# Patient Record
Sex: MALE | Race: BLACK OR AFRICAN AMERICAN | NOT HISPANIC OR LATINO | Employment: FULL TIME | URBAN - METROPOLITAN AREA
[De-identification: names, ages, dates, MRNs, and addresses within clinical notes are randomized per-mention and may not be internally consistent; named-entity substitution may affect disease eponyms.]

---

## 2024-05-31 ENCOUNTER — APPOINTMENT (OUTPATIENT)
Dept: RADIOLOGY | Facility: HOSPITAL | Age: 34
End: 2024-05-31

## 2024-05-31 ENCOUNTER — HOSPITAL ENCOUNTER (INPATIENT)
Facility: HOSPITAL | Age: 34
LOS: 4 days | Discharge: HOME | End: 2024-06-06
Attending: EMERGENCY MEDICINE | Admitting: ORTHOPAEDIC SURGERY

## 2024-05-31 DIAGNOSIS — S42.402A CLOSED FRACTURE OF LEFT ELBOW, INITIAL ENCOUNTER: Primary | ICD-10-CM

## 2024-05-31 DIAGNOSIS — S52.272A CLOSED MONTEGGIA'S FRACTURE OF LEFT ULNA, INITIAL ENCOUNTER: ICD-10-CM

## 2024-05-31 LAB
ANION GAP SERPL CALC-SCNC: 13 MMOL/L (ref 10–20)
APTT PPP: 23 SECONDS (ref 27–38)
BUN SERPL-MCNC: 9 MG/DL (ref 6–23)
CALCIUM SERPL-MCNC: 9.5 MG/DL (ref 8.6–10.6)
CHLORIDE SERPL-SCNC: 104 MMOL/L (ref 98–107)
CO2 SERPL-SCNC: 27 MMOL/L (ref 21–32)
CREAT SERPL-MCNC: 1.17 MG/DL (ref 0.5–1.3)
EGFRCR SERPLBLD CKD-EPI 2021: 84 ML/MIN/1.73M*2
ERYTHROCYTE [DISTWIDTH] IN BLOOD BY AUTOMATED COUNT: 12.3 % (ref 11.5–14.5)
GLUCOSE SERPL-MCNC: 106 MG/DL (ref 74–99)
HCT VFR BLD AUTO: 43.6 % (ref 41–52)
HGB BLD-MCNC: 15 G/DL (ref 13.5–17.5)
INR PPP: 1 (ref 0.9–1.1)
MCH RBC QN AUTO: 26.9 PG (ref 26–34)
MCHC RBC AUTO-ENTMCNC: 34.4 G/DL (ref 32–36)
MCV RBC AUTO: 78 FL (ref 80–100)
NRBC BLD-RTO: 0 /100 WBCS (ref 0–0)
PLATELET # BLD AUTO: 194 X10*3/UL (ref 150–450)
POTASSIUM SERPL-SCNC: 4.2 MMOL/L (ref 3.5–5.3)
PROTHROMBIN TIME: 11.2 SECONDS (ref 9.8–12.8)
RBC # BLD AUTO: 5.58 X10*6/UL (ref 4.5–5.9)
SODIUM SERPL-SCNC: 140 MMOL/L (ref 136–145)
WBC # BLD AUTO: 7.1 X10*3/UL (ref 4.4–11.3)

## 2024-05-31 PROCEDURE — 85730 THROMBOPLASTIN TIME PARTIAL: CPT | Performed by: EMERGENCY MEDICINE

## 2024-05-31 PROCEDURE — 73060 X-RAY EXAM OF HUMERUS: CPT | Mod: LT

## 2024-05-31 PROCEDURE — 73100 X-RAY EXAM OF WRIST: CPT | Mod: LT

## 2024-05-31 PROCEDURE — 73110 X-RAY EXAM OF WRIST: CPT | Mod: LT

## 2024-05-31 PROCEDURE — 2500000005 HC RX 250 GENERAL PHARMACY W/O HCPCS

## 2024-05-31 PROCEDURE — 73090 X-RAY EXAM OF FOREARM: CPT | Mod: LT

## 2024-05-31 PROCEDURE — 71045 X-RAY EXAM CHEST 1 VIEW: CPT

## 2024-05-31 PROCEDURE — 73090 X-RAY EXAM OF FOREARM: CPT | Mod: LEFT SIDE | Performed by: STUDENT IN AN ORGANIZED HEALTH CARE EDUCATION/TRAINING PROGRAM

## 2024-05-31 PROCEDURE — 99285 EMERGENCY DEPT VISIT HI MDM: CPT | Performed by: EMERGENCY MEDICINE

## 2024-05-31 PROCEDURE — 96375 TX/PRO/DX INJ NEW DRUG ADDON: CPT

## 2024-05-31 PROCEDURE — 73060 X-RAY EXAM OF HUMERUS: CPT | Mod: LEFT SIDE | Performed by: STUDENT IN AN ORGANIZED HEALTH CARE EDUCATION/TRAINING PROGRAM

## 2024-05-31 PROCEDURE — 2500000004 HC RX 250 GENERAL PHARMACY W/ HCPCS (ALT 636 FOR OP/ED)

## 2024-05-31 PROCEDURE — 36415 COLL VENOUS BLD VENIPUNCTURE: CPT

## 2024-05-31 PROCEDURE — 36415 COLL VENOUS BLD VENIPUNCTURE: CPT | Performed by: EMERGENCY MEDICINE

## 2024-05-31 PROCEDURE — 76000 FLUOROSCOPY <1 HR PHYS/QHP: CPT

## 2024-05-31 PROCEDURE — 2500000004 HC RX 250 GENERAL PHARMACY W/ HCPCS (ALT 636 FOR OP/ED): Performed by: EMERGENCY MEDICINE

## 2024-05-31 PROCEDURE — 71045 X-RAY EXAM CHEST 1 VIEW: CPT | Performed by: STUDENT IN AN ORGANIZED HEALTH CARE EDUCATION/TRAINING PROGRAM

## 2024-05-31 PROCEDURE — 73030 X-RAY EXAM OF SHOULDER: CPT | Mod: LEFT SIDE | Performed by: STUDENT IN AN ORGANIZED HEALTH CARE EDUCATION/TRAINING PROGRAM

## 2024-05-31 PROCEDURE — 73070 X-RAY EXAM OF ELBOW: CPT | Mod: LT

## 2024-05-31 PROCEDURE — 73070 X-RAY EXAM OF ELBOW: CPT | Mod: LEFT SIDE | Performed by: STUDENT IN AN ORGANIZED HEALTH CARE EDUCATION/TRAINING PROGRAM

## 2024-05-31 PROCEDURE — 73030 X-RAY EXAM OF SHOULDER: CPT | Mod: LT

## 2024-05-31 PROCEDURE — 73100 X-RAY EXAM OF WRIST: CPT | Mod: LEFT SIDE | Performed by: STUDENT IN AN ORGANIZED HEALTH CARE EDUCATION/TRAINING PROGRAM

## 2024-05-31 PROCEDURE — 94681 O2 UPTK CO2 OUTP % O2 XTRC: CPT

## 2024-05-31 PROCEDURE — 85027 COMPLETE CBC AUTOMATED: CPT | Performed by: EMERGENCY MEDICINE

## 2024-05-31 PROCEDURE — 96376 TX/PRO/DX INJ SAME DRUG ADON: CPT

## 2024-05-31 PROCEDURE — 73080 X-RAY EXAM OF ELBOW: CPT | Mod: LT

## 2024-05-31 PROCEDURE — 86901 BLOOD TYPING SEROLOGIC RH(D): CPT

## 2024-05-31 PROCEDURE — 85610 PROTHROMBIN TIME: CPT | Performed by: EMERGENCY MEDICINE

## 2024-05-31 PROCEDURE — 86900 BLOOD TYPING SEROLOGIC ABO: CPT

## 2024-05-31 PROCEDURE — 80048 BASIC METABOLIC PNL TOTAL CA: CPT | Performed by: EMERGENCY MEDICINE

## 2024-05-31 PROCEDURE — 96374 THER/PROPH/DIAG INJ IV PUSH: CPT

## 2024-05-31 PROCEDURE — 99285 EMERGENCY DEPT VISIT HI MDM: CPT | Mod: 25

## 2024-05-31 PROCEDURE — 24620 CLTX MONTEGGIA FX DISLC ELBW: CPT | Mod: LT

## 2024-05-31 PROCEDURE — 0PSJXZZ REPOSITION LEFT RADIUS, EXTERNAL APPROACH: ICD-10-PCS

## 2024-05-31 RX ORDER — HYDROMORPHONE HYDROCHLORIDE 1 MG/ML
INJECTION, SOLUTION INTRAMUSCULAR; INTRAVENOUS; SUBCUTANEOUS
Status: COMPLETED
Start: 2024-05-31 | End: 2024-05-31

## 2024-05-31 RX ORDER — ONDANSETRON HYDROCHLORIDE 2 MG/ML
4 INJECTION, SOLUTION INTRAVENOUS ONCE
Status: COMPLETED | OUTPATIENT
Start: 2024-05-31 | End: 2024-05-31

## 2024-05-31 RX ORDER — HYDROMORPHONE HYDROCHLORIDE 1 MG/ML
1 INJECTION, SOLUTION INTRAMUSCULAR; INTRAVENOUS; SUBCUTANEOUS ONCE
Status: COMPLETED | OUTPATIENT
Start: 2024-05-31 | End: 2024-05-31

## 2024-05-31 RX ORDER — PROPOFOL 10 MG/ML
150 INJECTION, EMULSION INTRAVENOUS ONCE
Status: COMPLETED | OUTPATIENT
Start: 2024-05-31 | End: 2024-05-31

## 2024-05-31 RX ORDER — PROPOFOL 10 MG/ML
INJECTION, EMULSION INTRAVENOUS
Status: COMPLETED
Start: 2024-05-31 | End: 2024-05-31

## 2024-05-31 RX ORDER — ONDANSETRON HYDROCHLORIDE 2 MG/ML
INJECTION, SOLUTION INTRAVENOUS
Status: COMPLETED
Start: 2024-05-31 | End: 2024-05-31

## 2024-05-31 RX ORDER — HYDROMORPHONE HYDROCHLORIDE 1 MG/ML
0.5 INJECTION, SOLUTION INTRAMUSCULAR; INTRAVENOUS; SUBCUTANEOUS ONCE
Status: COMPLETED | OUTPATIENT
Start: 2024-05-31 | End: 2024-05-31

## 2024-05-31 RX ADMIN — Medication 4 L/MIN: at 23:10

## 2024-05-31 RX ADMIN — PROPOFOL 150 MG: 10 INJECTION, EMULSION INTRAVENOUS at 23:08

## 2024-05-31 RX ADMIN — HYDROMORPHONE HYDROCHLORIDE 1 MG: 1 INJECTION, SOLUTION INTRAMUSCULAR; INTRAVENOUS; SUBCUTANEOUS at 22:09

## 2024-05-31 RX ADMIN — ONDANSETRON 4 MG: 2 INJECTION, SOLUTION INTRAMUSCULAR; INTRAVENOUS at 19:40

## 2024-05-31 RX ADMIN — HYDROMORPHONE HYDROCHLORIDE 0.5 MG: 1 INJECTION, SOLUTION INTRAMUSCULAR; INTRAVENOUS; SUBCUTANEOUS at 19:40

## 2024-05-31 RX ADMIN — HYDROMORPHONE HYDROCHLORIDE 1 MG: 1 INJECTION, SOLUTION INTRAMUSCULAR; INTRAVENOUS; SUBCUTANEOUS at 23:25

## 2024-05-31 RX ADMIN — ONDANSETRON HYDROCHLORIDE 4 MG: 2 INJECTION, SOLUTION INTRAVENOUS at 19:40

## 2024-05-31 RX ADMIN — Medication 50 MG: at 23:05

## 2024-05-31 RX ADMIN — HYDROMORPHONE HYDROCHLORIDE 1 MG: 1 INJECTION, SOLUTION INTRAMUSCULAR; INTRAVENOUS; SUBCUTANEOUS at 20:23

## 2024-05-31 RX ADMIN — ONDANSETRON 4 MG: 2 INJECTION INTRAMUSCULAR; INTRAVENOUS at 23:14

## 2024-05-31 ASSESSMENT — PAIN DESCRIPTION - ORIENTATION
ORIENTATION: LEFT
ORIENTATION: LEFT

## 2024-05-31 ASSESSMENT — PAIN DESCRIPTION - LOCATION
LOCATION: ARM
LOCATION: ARM

## 2024-05-31 ASSESSMENT — PAIN DESCRIPTION - DESCRIPTORS: DESCRIPTORS: SHOOTING;SHARP

## 2024-05-31 ASSESSMENT — LIFESTYLE VARIABLES
HAVE YOU EVER FELT YOU SHOULD CUT DOWN ON YOUR DRINKING: NO
HAVE PEOPLE ANNOYED YOU BY CRITICIZING YOUR DRINKING: NO
EVER HAD A DRINK FIRST THING IN THE MORNING TO STEADY YOUR NERVES TO GET RID OF A HANGOVER: NO
EVER FELT BAD OR GUILTY ABOUT YOUR DRINKING: NO
TOTAL SCORE: 0

## 2024-05-31 ASSESSMENT — PAIN DESCRIPTION - PROGRESSION
CLINICAL_PROGRESSION: GRADUALLY WORSENING
CLINICAL_PROGRESSION: GRADUALLY IMPROVING

## 2024-05-31 ASSESSMENT — PAIN SCALES - GENERAL
PAINLEVEL_OUTOF10: 2
PAINLEVEL_OUTOF10: 10 - WORST POSSIBLE PAIN
PAINLEVEL_OUTOF10: 9

## 2024-05-31 ASSESSMENT — PAIN - FUNCTIONAL ASSESSMENT
PAIN_FUNCTIONAL_ASSESSMENT: 0-10

## 2024-05-31 ASSESSMENT — COLUMBIA-SUICIDE SEVERITY RATING SCALE - C-SSRS
6. HAVE YOU EVER DONE ANYTHING, STARTED TO DO ANYTHING, OR PREPARED TO DO ANYTHING TO END YOUR LIFE?: NO
2. HAVE YOU ACTUALLY HAD ANY THOUGHTS OF KILLING YOURSELF?: NO
1. IN THE PAST MONTH, HAVE YOU WISHED YOU WERE DEAD OR WISHED YOU COULD GO TO SLEEP AND NOT WAKE UP?: NO

## 2024-05-31 ASSESSMENT — PAIN DESCRIPTION - PAIN TYPE
TYPE: ACUTE PAIN
TYPE: ACUTE PAIN
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
## 2024-06-01 ENCOUNTER — CLINICAL SUPPORT (OUTPATIENT)
Dept: EMERGENCY MEDICINE | Facility: HOSPITAL | Age: 34
End: 2024-06-01

## 2024-06-01 ENCOUNTER — APPOINTMENT (OUTPATIENT)
Dept: RADIOLOGY | Facility: HOSPITAL | Age: 34
End: 2024-06-01

## 2024-06-01 PROBLEM — S52.272A CLOSED MONTEGGIA'S FRACTURE OF LEFT ARM: Status: ACTIVE | Noted: 2024-05-31

## 2024-06-01 PROBLEM — S42.402A CLOSED FRACTURE OF LEFT ELBOW, INITIAL ENCOUNTER: Status: ACTIVE | Noted: 2024-06-01

## 2024-06-01 LAB
ABO GROUP (TYPE) IN BLOOD: NORMAL
ANTIBODY SCREEN: NORMAL
ATRIAL RATE: 79 BPM
P AXIS: 51 DEGREES
P OFFSET: 203 MS
P ONSET: 152 MS
PR INTERVAL: 142 MS
Q ONSET: 223 MS
QRS COUNT: 13 BEATS
QRS DURATION: 86 MS
QT INTERVAL: 370 MS
QTC CALCULATION(BAZETT): 424 MS
QTC FREDERICIA: 405 MS
R AXIS: 114 DEGREES
RH FACTOR (ANTIGEN D): NORMAL
T AXIS: -4 DEGREES
T OFFSET: 408 MS
VENTRICULAR RATE: 79 BPM

## 2024-06-01 PROCEDURE — G0378 HOSPITAL OBSERVATION PER HR: HCPCS

## 2024-06-01 PROCEDURE — 73200 CT UPPER EXTREMITY W/O DYE: CPT | Mod: LT

## 2024-06-01 PROCEDURE — 2500000001 HC RX 250 WO HCPCS SELF ADMINISTERED DRUGS (ALT 637 FOR MEDICARE OP)

## 2024-06-01 PROCEDURE — 2500000004 HC RX 250 GENERAL PHARMACY W/ HCPCS (ALT 636 FOR OP/ED)

## 2024-06-01 PROCEDURE — 93005 ELECTROCARDIOGRAM TRACING: CPT

## 2024-06-01 PROCEDURE — 2500000005 HC RX 250 GENERAL PHARMACY W/O HCPCS

## 2024-06-01 PROCEDURE — 73200 CT UPPER EXTREMITY W/O DYE: CPT | Mod: LEFT SIDE | Performed by: STUDENT IN AN ORGANIZED HEALTH CARE EDUCATION/TRAINING PROGRAM

## 2024-06-01 RX ORDER — HYDROMORPHONE HYDROCHLORIDE 1 MG/ML
0.5 INJECTION, SOLUTION INTRAMUSCULAR; INTRAVENOUS; SUBCUTANEOUS EVERY 2 HOUR PRN
Status: DISCONTINUED | OUTPATIENT
Start: 2024-06-01 | End: 2024-06-04

## 2024-06-01 RX ORDER — BISACODYL 10 MG/1
10 SUPPOSITORY RECTAL DAILY PRN
Status: DISCONTINUED | OUTPATIENT
Start: 2024-06-01 | End: 2024-06-06 | Stop reason: HOSPADM

## 2024-06-01 RX ORDER — OXYCODONE HYDROCHLORIDE 5 MG/1
5 TABLET ORAL EVERY 4 HOURS PRN
Status: DISCONTINUED | OUTPATIENT
Start: 2024-06-01 | End: 2024-06-04

## 2024-06-01 RX ORDER — ADHESIVE BANDAGE
10 BANDAGE TOPICAL DAILY PRN
Status: DISCONTINUED | OUTPATIENT
Start: 2024-06-01 | End: 2024-06-06 | Stop reason: HOSPADM

## 2024-06-01 RX ORDER — POLYETHYLENE GLYCOL 3350 17 G/17G
17 POWDER, FOR SOLUTION ORAL DAILY
Status: DISCONTINUED | OUTPATIENT
Start: 2024-06-01 | End: 2024-06-06 | Stop reason: HOSPADM

## 2024-06-01 RX ORDER — SODIUM CHLORIDE, SODIUM LACTATE, POTASSIUM CHLORIDE, CALCIUM CHLORIDE 600; 310; 30; 20 MG/100ML; MG/100ML; MG/100ML; MG/100ML
50 INJECTION, SOLUTION INTRAVENOUS CONTINUOUS
Status: DISCONTINUED | OUTPATIENT
Start: 2024-06-01 | End: 2024-06-06 | Stop reason: HOSPADM

## 2024-06-01 RX ORDER — ACETAMINOPHEN 325 MG/1
650 TABLET ORAL EVERY 6 HOURS SCHEDULED
Status: DISCONTINUED | OUTPATIENT
Start: 2024-06-01 | End: 2024-06-05

## 2024-06-01 RX ORDER — OXYCODONE HYDROCHLORIDE 5 MG/1
10 TABLET ORAL EVERY 4 HOURS PRN
Status: DISCONTINUED | OUTPATIENT
Start: 2024-06-01 | End: 2024-06-04

## 2024-06-01 RX ORDER — OXYCODONE HYDROCHLORIDE 5 MG/1
5 TABLET ORAL EVERY 6 HOURS PRN
Status: DISCONTINUED | OUTPATIENT
Start: 2024-06-01 | End: 2024-06-01

## 2024-06-01 RX ORDER — NALOXONE HYDROCHLORIDE 0.4 MG/ML
0.2 INJECTION, SOLUTION INTRAMUSCULAR; INTRAVENOUS; SUBCUTANEOUS EVERY 5 MIN PRN
Status: DISCONTINUED | OUTPATIENT
Start: 2024-06-01 | End: 2024-06-04 | Stop reason: SDUPTHER

## 2024-06-01 RX ORDER — AMOXICILLIN 250 MG
2 CAPSULE ORAL 2 TIMES DAILY
Status: DISCONTINUED | OUTPATIENT
Start: 2024-06-01 | End: 2024-06-06 | Stop reason: HOSPADM

## 2024-06-01 RX ADMIN — OXYCODONE HYDROCHLORIDE 10 MG: 5 TABLET ORAL at 16:16

## 2024-06-01 RX ADMIN — ACETAMINOPHEN 650 MG: 325 TABLET ORAL at 06:00

## 2024-06-01 RX ADMIN — SENNOSIDES AND DOCUSATE SODIUM 2 TABLET: 50; 8.6 TABLET ORAL at 02:36

## 2024-06-01 RX ADMIN — SODIUM CHLORIDE, POTASSIUM CHLORIDE, SODIUM LACTATE AND CALCIUM CHLORIDE 125 ML/HR: 600; 310; 30; 20 INJECTION, SOLUTION INTRAVENOUS at 02:35

## 2024-06-01 RX ADMIN — SODIUM CHLORIDE, POTASSIUM CHLORIDE, SODIUM LACTATE AND CALCIUM CHLORIDE 125 ML/HR: 600; 310; 30; 20 INJECTION, SOLUTION INTRAVENOUS at 20:40

## 2024-06-01 RX ADMIN — HYDROMORPHONE HYDROCHLORIDE 0.5 MG: 1 INJECTION, SOLUTION INTRAMUSCULAR; INTRAVENOUS; SUBCUTANEOUS at 11:26

## 2024-06-01 RX ADMIN — OXYCODONE HYDROCHLORIDE 10 MG: 5 TABLET ORAL at 20:36

## 2024-06-01 RX ADMIN — OXYCODONE HYDROCHLORIDE 10 MG: 5 TABLET ORAL at 12:22

## 2024-06-01 RX ADMIN — Medication 2 L/MIN: at 02:05

## 2024-06-01 RX ADMIN — ACETAMINOPHEN 650 MG: 325 TABLET ORAL at 12:22

## 2024-06-01 RX ADMIN — ACETAMINOPHEN 650 MG: 325 TABLET ORAL at 02:36

## 2024-06-01 RX ADMIN — ACETAMINOPHEN 650 MG: 325 TABLET ORAL at 23:48

## 2024-06-01 RX ADMIN — HYDROMORPHONE HYDROCHLORIDE 0.5 MG: 1 INJECTION, SOLUTION INTRAMUSCULAR; INTRAVENOUS; SUBCUTANEOUS at 09:04

## 2024-06-01 RX ADMIN — HYDROMORPHONE HYDROCHLORIDE 0.5 MG: 1 INJECTION, SOLUTION INTRAMUSCULAR; INTRAVENOUS; SUBCUTANEOUS at 21:45

## 2024-06-01 RX ADMIN — HYDROMORPHONE HYDROCHLORIDE 0.5 MG: 1 INJECTION, SOLUTION INTRAMUSCULAR; INTRAVENOUS; SUBCUTANEOUS at 16:56

## 2024-06-01 RX ADMIN — HYDROMORPHONE HYDROCHLORIDE 0.5 MG: 1 INJECTION, SOLUTION INTRAMUSCULAR; INTRAVENOUS; SUBCUTANEOUS at 05:27

## 2024-06-01 RX ADMIN — HYDROMORPHONE HYDROCHLORIDE 0.5 MG: 1 INJECTION, SOLUTION INTRAMUSCULAR; INTRAVENOUS; SUBCUTANEOUS at 14:50

## 2024-06-01 RX ADMIN — OXYCODONE HYDROCHLORIDE 10 MG: 5 TABLET ORAL at 07:49

## 2024-06-01 SDOH — SOCIAL STABILITY: SOCIAL INSECURITY: ARE THERE ANY APPARENT SIGNS OF INJURIES/BEHAVIORS THAT COULD BE RELATED TO ABUSE/NEGLECT?: NO

## 2024-06-01 SDOH — SOCIAL STABILITY: SOCIAL INSECURITY: DO YOU FEEL UNSAFE GOING BACK TO THE PLACE WHERE YOU ARE LIVING?: NO

## 2024-06-01 SDOH — SOCIAL STABILITY: SOCIAL INSECURITY: DO YOU FEEL ANYONE HAS EXPLOITED OR TAKEN ADVANTAGE OF YOU FINANCIALLY OR OF YOUR PERSONAL PROPERTY?: NO

## 2024-06-01 SDOH — SOCIAL STABILITY: SOCIAL INSECURITY: HAS ANYONE EVER THREATENED TO HURT YOUR FAMILY OR YOUR PETS?: NO

## 2024-06-01 SDOH — SOCIAL STABILITY: SOCIAL INSECURITY: ABUSE: ADULT

## 2024-06-01 SDOH — SOCIAL STABILITY: SOCIAL INSECURITY: DOES ANYONE TRY TO KEEP YOU FROM HAVING/CONTACTING OTHER FRIENDS OR DOING THINGS OUTSIDE YOUR HOME?: NO

## 2024-06-01 SDOH — SOCIAL STABILITY: SOCIAL INSECURITY: HAVE YOU HAD ANY THOUGHTS OF HARMING ANYONE ELSE?: NO

## 2024-06-01 SDOH — SOCIAL STABILITY: SOCIAL INSECURITY: HAVE YOU HAD THOUGHTS OF HARMING ANYONE ELSE?: NO

## 2024-06-01 SDOH — SOCIAL STABILITY: SOCIAL INSECURITY: ARE YOU OR HAVE YOU BEEN THREATENED OR ABUSED PHYSICALLY, EMOTIONALLY, OR SEXUALLY BY ANYONE?: NO

## 2024-06-01 ASSESSMENT — ACTIVITIES OF DAILY LIVING (ADL)
DRESSING YOURSELF: INDEPENDENT
LACK_OF_TRANSPORTATION: NO
BATHING: INDEPENDENT
HEARING - RIGHT EAR: FUNCTIONAL
ADEQUATE_TO_COMPLETE_ADL: YES
LACK_OF_TRANSPORTATION: NO
GROOMING: INDEPENDENT
HEARING - LEFT EAR: FUNCTIONAL
FEEDING YOURSELF: INDEPENDENT
PATIENT'S MEMORY ADEQUATE TO SAFELY COMPLETE DAILY ACTIVITIES?: YES
JUDGMENT_ADEQUATE_SAFELY_COMPLETE_DAILY_ACTIVITIES: YES
WALKS IN HOME: INDEPENDENT
TOILETING: INDEPENDENT

## 2024-06-01 ASSESSMENT — COGNITIVE AND FUNCTIONAL STATUS - GENERAL
WALKING IN HOSPITAL ROOM: A LITTLE
TURNING FROM BACK TO SIDE WHILE IN FLAT BAD: A LITTLE
EATING MEALS: A LITTLE
CLIMB 3 TO 5 STEPS WITH RAILING: A LITTLE
MOVING TO AND FROM BED TO CHAIR: A LITTLE
MOBILITY SCORE: 18
DAILY ACTIVITIY SCORE: 18
TOILETING: A LITTLE
HELP NEEDED FOR BATHING: A LITTLE
MOVING FROM LYING ON BACK TO SITTING ON SIDE OF FLAT BED WITH BEDRAILS: A LITTLE
PATIENT BASELINE BEDBOUND: NO
DRESSING REGULAR UPPER BODY CLOTHING: A LITTLE
DRESSING REGULAR LOWER BODY CLOTHING: A LITTLE
STANDING UP FROM CHAIR USING ARMS: A LITTLE
PERSONAL GROOMING: A LITTLE

## 2024-06-01 ASSESSMENT — LIFESTYLE VARIABLES
HOW MANY STANDARD DRINKS CONTAINING ALCOHOL DO YOU HAVE ON A TYPICAL DAY: 1 OR 2
AUDIT-C TOTAL SCORE: 2
HOW OFTEN DO YOU HAVE A DRINK CONTAINING ALCOHOL: MONTHLY OR LESS
SKIP TO QUESTIONS 9-10: 0
HOW OFTEN DO YOU HAVE 6 OR MORE DRINKS ON ONE OCCASION: LESS THAN MONTHLY
AUDIT-C TOTAL SCORE: 2

## 2024-06-01 ASSESSMENT — PAIN - FUNCTIONAL ASSESSMENT
PAIN_FUNCTIONAL_ASSESSMENT: 0-10

## 2024-06-01 ASSESSMENT — PAIN SCALES - GENERAL
PAINLEVEL_OUTOF10: 10 - WORST POSSIBLE PAIN
PAINLEVEL_OUTOF10: 10 - WORST POSSIBLE PAIN
PAINLEVEL_OUTOF10: 0 - NO PAIN
PAINLEVEL_OUTOF10: 2
PAINLEVEL_OUTOF10: 10 - WORST POSSIBLE PAIN
PAINLEVEL_OUTOF10: 8
PAINLEVEL_OUTOF10: 10 - WORST POSSIBLE PAIN
PAINLEVEL_OUTOF10: 10 - WORST POSSIBLE PAIN
PAINLEVEL_OUTOF10: 8
PAINLEVEL_OUTOF10: 10 - WORST POSSIBLE PAIN

## 2024-06-01 ASSESSMENT — PATIENT HEALTH QUESTIONNAIRE - PHQ9
1. LITTLE INTEREST OR PLEASURE IN DOING THINGS: NOT AT ALL
2. FEELING DOWN, DEPRESSED OR HOPELESS: NOT AT ALL
SUM OF ALL RESPONSES TO PHQ9 QUESTIONS 1 & 2: 0

## 2024-06-01 ASSESSMENT — PAIN DESCRIPTION - LOCATION
LOCATION: ARM

## 2024-06-01 ASSESSMENT — PAIN DESCRIPTION - ORIENTATION
ORIENTATION: LEFT

## 2024-06-01 NOTE — CARE PLAN
Problem: Pain  Goal: My pain/discomfort is manageable  Outcome: Progressing     Problem: Safety  Goal: Patient will be injury free during hospitalization  Outcome: Progressing  Goal: I will remain free of falls  Outcome: Progressing     Problem: Daily Care  Goal: Daily care needs are met  Outcome: Progressing     Problem: Psychosocial Needs  Goal: Demonstrates ability to cope with hospitalization/illness  Outcome: Progressing  Goal: Collaborate with me, my family, and caregiver to identify my specific goals  Outcome: Progressing  Flowsheets (Taken 6/1/2024 7452)  Cultural Requests During Hospitalization: na  Spiritual Requests During Hospitalization: na     Problem: Discharge Barriers  Goal: My discharge needs are met  Outcome: Progressing   The patient's goals for the shift include      The clinical goals for the shift include pain contrl

## 2024-06-01 NOTE — H&P
[facility] Department of Orthopaedic Surgery   Surgical History & Physical >30 Days    Reason for Surgery: Left Elbow Monteggia Fracture Dislocation w/ Suspected DRUJ Injury   Planned Procedure: ORIF L radius and ulna, possible L radial head arthroplasty, possible L DRUJ fixation     History & Physical Reviewed:  Herberth Brandt is a 33 y.o. male presenting with the above symptoms. This patient was evaluated in the ED, and a plan was made for operative management. Risks and benefits were discussed, and the patient and/or caregivers elected to proceed. The patient presents for the above listed procedure today.     Home medications were reviewed with significant updates noted below:  No significant changes.    Allergies:  No Known Allergies    Review of Systems:  12 point review of systems reviewed and neative     Physical Exam:   · Physical Exam:  - Constitutional: No acute distress, cooperative  - Eyes: EOM grossly intact  - Head/Neck: Trachea midline  - Respiratory/Thorax: Normal work of breathing  - Gastrointestinal: Non tender  - Psychological: Appropriate mood/behavior  - Skin: Warm and dry  - Musculoskeletal: moves extremities    ERAS patient?: No    COVID-19 Risk Consent:   Surgeon has reviewed the key risks related to bertram COVID-19 and subsequent sequelae.       06/01/24 at 7:13 AM - Ru Kirk MD

## 2024-06-01 NOTE — ED PROCEDURE NOTE
Procedure  Moderate Sedation    Performed by: Denise Sutherland DO  Authorized by: Antolin Terry MD MPH    Consent:     Consent obtained:  Written    Consent given by:  Patient    Risks, benefits, and alternatives were discussed: yes      Risks discussed:  Inadequate sedation, nausea, prolonged sedation necessitating reversal and respiratory compromise necessitating ventilatory assistance and intubation  Universal protocol:     Patient identity confirmed:  Arm band  Indications:     Procedure performed:  Dislocation reduction    Procedure necessitating sedation performed by:  Physician performing sedation    Intended level of sedation:  Moderate  Pre-sedation assessment:     NPO status caution: urgency dictates proceeding with non-ideal NPO status      ASA classification: class 1 - normal, healthy patient      Mouth openin finger widths    Thyromental distance:  2 finger widths    Mallampati score:  III - soft palate, base of uvula visible    Neck mobility: normal      Pre-sedation assessments completed and reviewed: airway patency      Pre-sedation assessment completed:  2024 10:00 PM  Procedure details (see MAR for exact dosages):     Sedation:  Propofol (propofol 150mg, ketamine 50mg)    Analgesia:  Hydromorphone (1mg)    Intra-procedure monitoring:  Blood pressure monitoring, cardiac monitor, continuous capnometry, continuous pulse oximetry, frequent vital sign checks and frequent LOC assessments    Intra-procedure events: none      Intra-procedure management:  Supplemental oxygen  Post-procedure details:     Specimens recovered:  None    Procedure completion:  Tolerated               Denise Sutherland DO  Resident  [id number] [phone]
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
## 2024-06-01 NOTE — PROGRESS NOTES
06/01/24 1203   Discharge Planning   Living Arrangements Parent;Family members  (Lives with mothers and sister)   Support Systems Parent;Friends/neighbors;Family members   Assistance Needed Patient independent prior to admission   Type of Residence Private residence   Number of Stairs to Enter Residence 3   Number of Stairs Within Residence 0   Do you have animals or pets at home? No   Who is requesting discharge planning? Provider   Patient expects to be discharged to: Pending post op therapy   Does the patient need discharge transport arranged? No   Financial Resource Strain   How hard is it for you to pay for the very basics like food, housing, medical care, and heating? Not hard   Housing Stability   In the last 12 months, was there a time when you were not able to pay the mortgage or rent on time? N   In the last 12 months, how many places have you lived? 1   In the last 12 months, was there a time when you did not have a steady place to sleep or slept in a shelter (including now)? N   Transportation Needs   In the past 12 months, has lack of transportation kept you from medical appointments or from getting medications? no   In the past 12 months, has lack of transportation kept you from meetings, work, or from getting things needed for daily living? No     Transitional Care Coordinator Note: TCC met with patient introduced self and role to complete assessment (see above) and discuss discharge planning. Patient confirmed demographics:    Address: [address] [place] [address]. [place] [address]  Alternate/Emergency Contact: Marge Brandt (mom) [phone]  Patient Contact: [phone]  DME: Denies use or ownership of DME   Homecare: No Active HC   Falls: 1 Fall   PCP: No Active PCP   Pharmacy: [facility] [address]   Insurance: Medicaid     Patient lives with mother and sisters in ranch style home. Patient independent prior to admission. Patient denies use of oxygen use, dialysis and/or diabetic needs. Per medical team  (Ortho) patient is not medically ready, pending OR. Discharge dispo: Pending post op therapy. TCC discussed possible discharge plan of home with HC, patient and family expressed understanding and agreeable to [facility] pending therapy recs. Family to assist with transportation home.       Viki Fang RN BSN   Transitional Care Coordinator

## 2024-06-02 PROCEDURE — 2500000001 HC RX 250 WO HCPCS SELF ADMINISTERED DRUGS (ALT 637 FOR MEDICARE OP)

## 2024-06-02 PROCEDURE — 2500000004 HC RX 250 GENERAL PHARMACY W/ HCPCS (ALT 636 FOR OP/ED)

## 2024-06-02 PROCEDURE — 1100000001 HC PRIVATE ROOM DAILY

## 2024-06-02 RX ADMIN — OXYCODONE HYDROCHLORIDE 10 MG: 5 TABLET ORAL at 16:14

## 2024-06-02 RX ADMIN — ACETAMINOPHEN 650 MG: 325 TABLET ORAL at 11:54

## 2024-06-02 RX ADMIN — HYDROMORPHONE HYDROCHLORIDE 0.5 MG: 1 INJECTION, SOLUTION INTRAMUSCULAR; INTRAVENOUS; SUBCUTANEOUS at 03:19

## 2024-06-02 RX ADMIN — OXYCODONE HYDROCHLORIDE 10 MG: 5 TABLET ORAL at 07:53

## 2024-06-02 RX ADMIN — OXYCODONE HYDROCHLORIDE 10 MG: 5 TABLET ORAL at 01:44

## 2024-06-02 RX ADMIN — HYDROMORPHONE HYDROCHLORIDE 0.5 MG: 1 INJECTION, SOLUTION INTRAMUSCULAR; INTRAVENOUS; SUBCUTANEOUS at 08:52

## 2024-06-02 RX ADMIN — ACETAMINOPHEN 650 MG: 325 TABLET ORAL at 23:51

## 2024-06-02 RX ADMIN — OXYCODONE HYDROCHLORIDE 10 MG: 5 TABLET ORAL at 11:54

## 2024-06-02 RX ADMIN — ACETAMINOPHEN 650 MG: 325 TABLET ORAL at 05:42

## 2024-06-02 RX ADMIN — ACETAMINOPHEN 650 MG: 325 TABLET ORAL at 17:38

## 2024-06-02 RX ADMIN — HYDROMORPHONE HYDROCHLORIDE 0.5 MG: 1 INJECTION, SOLUTION INTRAMUSCULAR; INTRAVENOUS; SUBCUTANEOUS at 14:49

## 2024-06-02 RX ADMIN — OXYCODONE HYDROCHLORIDE 10 MG: 5 TABLET ORAL at 20:34

## 2024-06-02 RX ADMIN — HYDROMORPHONE HYDROCHLORIDE 0.5 MG: 1 INJECTION, SOLUTION INTRAMUSCULAR; INTRAVENOUS; SUBCUTANEOUS at 00:41

## 2024-06-02 ASSESSMENT — COGNITIVE AND FUNCTIONAL STATUS - GENERAL
WALKING IN HOSPITAL ROOM: A LITTLE
PERSONAL GROOMING: A LITTLE
DRESSING REGULAR UPPER BODY CLOTHING: A LITTLE
STANDING UP FROM CHAIR USING ARMS: A LITTLE
TOILETING: A LITTLE
EATING MEALS: A LITTLE
MOVING TO AND FROM BED TO CHAIR: A LITTLE
STANDING UP FROM CHAIR USING ARMS: A LITTLE
MOBILITY SCORE: 18
DRESSING REGULAR LOWER BODY CLOTHING: A LITTLE
DRESSING REGULAR LOWER BODY CLOTHING: A LITTLE
TOILETING: A LITTLE
WALKING IN HOSPITAL ROOM: A LITTLE
EATING MEALS: A LITTLE
PERSONAL GROOMING: A LITTLE
MOBILITY SCORE: 18
DAILY ACTIVITIY SCORE: 18
MOVING TO AND FROM BED TO CHAIR: A LITTLE
TURNING FROM BACK TO SIDE WHILE IN FLAT BAD: A LITTLE
CLIMB 3 TO 5 STEPS WITH RAILING: A LITTLE
TURNING FROM BACK TO SIDE WHILE IN FLAT BAD: A LITTLE
STANDING UP FROM CHAIR USING ARMS: A LITTLE
MOVING TO AND FROM BED TO CHAIR: A LITTLE
HELP NEEDED FOR BATHING: A LITTLE
MOVING FROM LYING ON BACK TO SITTING ON SIDE OF FLAT BED WITH BEDRAILS: A LITTLE
MOVING FROM LYING ON BACK TO SITTING ON SIDE OF FLAT BED WITH BEDRAILS: A LITTLE
TURNING FROM BACK TO SIDE WHILE IN FLAT BAD: A LITTLE
CLIMB 3 TO 5 STEPS WITH RAILING: A LITTLE
CLIMB 3 TO 5 STEPS WITH RAILING: A LITTLE
HELP NEEDED FOR BATHING: A LITTLE
MOVING FROM LYING ON BACK TO SITTING ON SIDE OF FLAT BED WITH BEDRAILS: A LITTLE
DRESSING REGULAR UPPER BODY CLOTHING: A LITTLE
DAILY ACTIVITIY SCORE: 18

## 2024-06-02 ASSESSMENT — PAIN - FUNCTIONAL ASSESSMENT
PAIN_FUNCTIONAL_ASSESSMENT: 0-10

## 2024-06-02 ASSESSMENT — PAIN DESCRIPTION - ORIENTATION
ORIENTATION: LEFT

## 2024-06-02 ASSESSMENT — PAIN DESCRIPTION - DESCRIPTORS
DESCRIPTORS: ACHING;DISCOMFORT

## 2024-06-02 ASSESSMENT — PAIN SCALES - GENERAL
PAINLEVEL_OUTOF10: 8
PAINLEVEL_OUTOF10: 7
PAINLEVEL_OUTOF10: 7
PAINLEVEL_OUTOF10: 5 - MODERATE PAIN
PAINLEVEL_OUTOF10: 2
PAINLEVEL_OUTOF10: 7
PAINLEVEL_OUTOF10: 0 - NO PAIN
PAINLEVEL_OUTOF10: 7
PAINLEVEL_OUTOF10: 8
PAINLEVEL_OUTOF10: 7
PAINLEVEL_OUTOF10: 7

## 2024-06-02 ASSESSMENT — PAIN DESCRIPTION - LOCATION
LOCATION: ARM

## 2024-06-02 NOTE — CARE PLAN
The patient's goals for the shift include      The clinical goals for the shift include Patient will have pain wel-controlled throughout this shift

## 2024-06-02 NOTE — PROGRESS NOTES
"Orthopaedic Surgery Progress Note    Subjective:  No acute events overnight. Having some numbness in fingers. Pain well controlled. Denies chest pain, shortness of breath, or fevers.    Objective:  BP (!) 158/98 (BP Location: Right arm, Patient Position: Lying)   Pulse 78   Temp 35.9 °C (96.6 °F) (Temporal)   Resp 19   Ht 1.803 m (5' 11\")   Wt 136 kg (300 lb)   SpO2 95%   BMI 41.84 kg/m²     Gen: arousable, NAD, appropriately conversational  Cardiac: RRR to peripheral palpation  Resp: nonlabored on RA  GI: soft, nondistended    MSK:  LUE:   - splint in place, C/D/I  -Motor intact in axillary/AIN/PIN/ulnar distributions  -SILT axillary/radial distributions; somewhat decreased in median/ulnar distributions  -Hand wwp, 2+ radial pulse, cap refill brisk  -Compartments soft and compressible, no pain with passive stretch of digits      No results found for this or any previous visit (from the past 24 hour(s)).    FL less than 1 hour   Final Result      CT elbow left wo IV contrast   Final Result   1. Interval reduction of the posterior elbow dislocation. Ulnar   trochlea articulation is in anatomic alignment. Mildly displaced   coronoid fracture as well as comminuted and mildly displaced proximal   ulna diaphysis fracture.   2. Highly comminuted and impacted fracture of the radial head and   neck with a butterfly fracture fragment measuring 1.7 cm which   appears to be within the more medial  joint at the ulnar trochlear   articulation. The major radial head fragment appears to be   posteriorly subluxed relative to the capitellum.             I personally reviewed the images/study and I agree with the findings   as stated by Meri Caal MD. This study was interpreted at   [place], [place].        MACRO:   None        Signed by: Lai Odell 6/1/2024 2:41 AM   Dictation workstation:   [id number]      XR wrist left 3+ views   Final Result   No acute osseous " abnormality of the left wrist.        I personally reviewed the images/study and I agree with the findings   as stated by Meri Caal MD. This study was interpreted at   [place], [place].        MACRO:   None        Signed by: Lai Odell 6/1/2024 1:23 AM   Dictation workstation:   [id number]      XR elbow left 3+ views   Final Result   Interval reduction and splinting of the elbow with resolution of the   posterior dislocation. There is comminuted fracture of the radial   head as before. Additional fractures better visualized on prior.        I personally reviewed the images/study and I agree with the findings   as stated by Meri Caal MD. This study was interpreted at   [place], [place].        MACRO:   None        Signed by: Lai Odell 6/1/2024 1:18 AM   Dictation workstation:   [id number]      XR shoulder left 2+ views   Final Result   Comminuted fracture of the radial head with posterior dislocation of   the elbow joint. There is also avulsion/impaction fracture of the   ulna coronoid process. Comminuted mildly displaced fracture of the   proximal ulna diaphysis. This is a variation of Monteggia injury.             MACRO:   None        Signed by: Lai Odell 5/31/2024 8:48 PM   Dictation workstation:   [id number]      XR humerus left   Final Result   Comminuted fracture of the radial head with posterior dislocation of   the elbow joint. There is also avulsion/impaction fracture of the   ulna coronoid process. Comminuted mildly displaced fracture of the   proximal ulna diaphysis. This is a variation of Monteggia injury.             MACRO:   None        Signed by: Lai Odell 5/31/2024 8:48 PM   Dictation workstation:   [id number]      XR forearm left 2 views   Final Result   Comminuted fracture of the radial head with posterior dislocation of   the elbow joint. There is also  avulsion/impaction fracture of the   ulna coronoid process. Comminuted mildly displaced fracture of the   proximal ulna diaphysis. This is a variation of Monteggia injury.             MACRO:   None        Signed by: Lai Odell 5/31/2024 8:48 PM   Dictation workstation:   [id number]      XR elbow left 1-2 views   Final Result   Comminuted fracture of the radial head with posterior dislocation of   the elbow joint. There is also avulsion/impaction fracture of the   ulna coronoid process. Comminuted mildly displaced fracture of the   proximal ulna diaphysis. This is a variation of Monteggia injury.             MACRO:   None        Signed by: Lai Odell 5/31/2024 8:48 PM   Dictation workstation:   [id number]      XR chest 1 view   Final Result   1.  No evidence of acute cardiopulmonary process.             Signed by: Lai Odell 5/31/2024 8:48 PM   Dictation workstation:   [id number]      XR wrist left 1-2 views   Final Result   Comminuted fracture of the radial head with posterior dislocation of   the elbow joint. There is also avulsion/impaction fracture of the   ulna coronoid process. Comminuted mildly displaced fracture of the   proximal ulna diaphysis. This is a variation of Monteggia injury.             MACRO:   None        Signed by: Lai Odell 5/31/2024 8:48 PM   Dictation workstation:   [id number]      FL less than 1 hour    (Results Pending)       Assessment/Plan: 33M (Morbid Obesity, GERD) p/w above after fall from scooter w/  left monteggia fx/dislo w/ radial head/neck fx w/ suspected DRUJ injury. Closed, NVI. XR/CT w/ minimally displaced proximal ulna fx, coronoid fx, radial head/neck fracture dislocation and subluxation of the distal radial ulnar joint dorsally. Closed reduced under propofol sedation. STS-LAS. Post reduction XR/CT w/ radial neck fracture with displaced radial head fragment. Patient will require operative fixation.      Plan:  - Weight bearing: NWB LUE  - splint loosened  on rounds with immediate relief in hand numbness  - Preop labs complete  - DVT ppx: SCDs, chemoppx held prior to OR  - Diet: NPO at MN  - Pain: Tylenol, oxycodone 5/10, dilaudid breakthrough  - Lines: maintain PIVx2 while inpatient  - Bowel Regimen: Miralax, senna, dulcolax  - Pulm: Encourage IS, maintain O2 sat >92%  - Cardiac: no issues  - Glycemic: no issues  - Continue home medications  - [place]: none     Dispo: pending OR    Discussed with attending, Dr. Alaina Duran MD  Orthopedic Surgery PGY-1  Available by Epic Chat    While admitted, this patient will be followed by the Ortho Trauma Team, available via Epic Chat weekdays 6a-6p. Please page [phone] on nights and weekends.    Ortho Trauma  First Call: Oscar Duran, PGY-1  Second Call: Nicolas Soliz PGY-2  Third Call: Brooke Sanders, PGY-3

## 2024-06-02 NOTE — CARE PLAN
The patient's goals for the shift include      The clinical goals for the shift include Pain control and stable BP    Over the shift, the patient is making adequate progress towards stated care plan goals

## 2024-06-03 ENCOUNTER — ANESTHESIA (OUTPATIENT)
Dept: OPERATING ROOM | Facility: HOSPITAL | Age: 34
End: 2024-06-03

## 2024-06-03 ENCOUNTER — APPOINTMENT (OUTPATIENT)
Dept: RADIOLOGY | Facility: HOSPITAL | Age: 34
End: 2024-06-03

## 2024-06-03 ENCOUNTER — ANESTHESIA EVENT (OUTPATIENT)
Dept: OPERATING ROOM | Facility: HOSPITAL | Age: 34
End: 2024-06-03

## 2024-06-03 LAB
ABO GROUP (TYPE) IN BLOOD: NORMAL
ABO GROUP (TYPE) IN BLOOD: NORMAL
ANION GAP SERPL CALC-SCNC: 13 MMOL/L (ref 10–20)
ANTIBODY SCREEN: NORMAL
ANTIBODY SCREEN: NORMAL
BUN SERPL-MCNC: 7 MG/DL (ref 6–23)
CALCIUM SERPL-MCNC: 9.1 MG/DL (ref 8.6–10.6)
CHLORIDE SERPL-SCNC: 99 MMOL/L (ref 98–107)
CO2 SERPL-SCNC: 29 MMOL/L (ref 21–32)
CREAT SERPL-MCNC: 1.03 MG/DL (ref 0.5–1.3)
EGFRCR SERPLBLD CKD-EPI 2021: >90 ML/MIN/1.73M*2
ERYTHROCYTE [DISTWIDTH] IN BLOOD BY AUTOMATED COUNT: 12.5 % (ref 11.5–14.5)
GLUCOSE SERPL-MCNC: 110 MG/DL (ref 74–99)
HCT VFR BLD AUTO: 45.6 % (ref 41–52)
HGB BLD-MCNC: 15 G/DL (ref 13.5–17.5)
MCH RBC QN AUTO: 27.1 PG (ref 26–34)
MCHC RBC AUTO-ENTMCNC: 32.9 G/DL (ref 32–36)
MCV RBC AUTO: 82 FL (ref 80–100)
NRBC BLD-RTO: 0 /100 WBCS (ref 0–0)
PLATELET # BLD AUTO: 269 X10*3/UL (ref 150–450)
POTASSIUM SERPL-SCNC: 3.5 MMOL/L (ref 3.5–5.3)
RBC # BLD AUTO: 5.54 X10*6/UL (ref 4.5–5.9)
RH FACTOR (ANTIGEN D): NORMAL
RH FACTOR (ANTIGEN D): NORMAL
SODIUM SERPL-SCNC: 137 MMOL/L (ref 136–145)
WBC # BLD AUTO: 8.9 X10*3/UL (ref 4.4–11.3)

## 2024-06-03 PROCEDURE — 2500000004 HC RX 250 GENERAL PHARMACY W/ HCPCS (ALT 636 FOR OP/ED)

## 2024-06-03 PROCEDURE — 2500000001 HC RX 250 WO HCPCS SELF ADMINISTERED DRUGS (ALT 637 FOR MEDICARE OP)

## 2024-06-03 PROCEDURE — 80048 BASIC METABOLIC PNL TOTAL CA: CPT

## 2024-06-03 PROCEDURE — 1100000001 HC PRIVATE ROOM DAILY

## 2024-06-03 PROCEDURE — 85027 COMPLETE CBC AUTOMATED: CPT

## 2024-06-03 PROCEDURE — 86850 RBC ANTIBODY SCREEN: CPT

## 2024-06-03 PROCEDURE — 36415 COLL VENOUS BLD VENIPUNCTURE: CPT

## 2024-06-03 PROCEDURE — 86901 BLOOD TYPING SEROLOGIC RH(D): CPT

## 2024-06-03 PROCEDURE — 99222 1ST HOSP IP/OBS MODERATE 55: CPT | Performed by: ORTHOPAEDIC SURGERY

## 2024-06-03 RX ORDER — DIPHENHYDRAMINE HCL 25 MG
25 CAPSULE ORAL EVERY 6 HOURS PRN
Status: DISCONTINUED | OUTPATIENT
Start: 2024-06-03 | End: 2024-06-06 | Stop reason: HOSPADM

## 2024-06-03 RX ADMIN — ACETAMINOPHEN 650 MG: 325 TABLET ORAL at 23:35

## 2024-06-03 RX ADMIN — OXYCODONE HYDROCHLORIDE 10 MG: 5 TABLET ORAL at 22:05

## 2024-06-03 RX ADMIN — HYDROMORPHONE HYDROCHLORIDE 0.5 MG: 1 INJECTION, SOLUTION INTRAMUSCULAR; INTRAVENOUS; SUBCUTANEOUS at 19:57

## 2024-06-03 RX ADMIN — HYDROMORPHONE HYDROCHLORIDE 0.5 MG: 1 INJECTION, SOLUTION INTRAMUSCULAR; INTRAVENOUS; SUBCUTANEOUS at 08:49

## 2024-06-03 RX ADMIN — OXYCODONE HYDROCHLORIDE 10 MG: 5 TABLET ORAL at 04:59

## 2024-06-03 RX ADMIN — OXYCODONE HYDROCHLORIDE 10 MG: 5 TABLET ORAL at 16:06

## 2024-06-03 RX ADMIN — ACETAMINOPHEN 650 MG: 325 TABLET ORAL at 17:03

## 2024-06-03 RX ADMIN — OXYCODONE HYDROCHLORIDE 10 MG: 5 TABLET ORAL at 11:38

## 2024-06-03 RX ADMIN — DIPHENHYDRAMINE HYDROCHLORIDE 25 MG: 25 CAPSULE ORAL at 23:35

## 2024-06-03 RX ADMIN — DIPHENHYDRAMINE HYDROCHLORIDE 25 MG: 25 CAPSULE ORAL at 14:27

## 2024-06-03 RX ADMIN — ACETAMINOPHEN 650 MG: 325 TABLET ORAL at 05:59

## 2024-06-03 ASSESSMENT — COGNITIVE AND FUNCTIONAL STATUS - GENERAL
STANDING UP FROM CHAIR USING ARMS: A LITTLE
DRESSING REGULAR LOWER BODY CLOTHING: A LITTLE
DAILY ACTIVITIY SCORE: 18
MOVING TO AND FROM BED TO CHAIR: A LITTLE
CLIMB 3 TO 5 STEPS WITH RAILING: A LITTLE
PERSONAL GROOMING: A LITTLE
MOBILITY SCORE: 18
WALKING IN HOSPITAL ROOM: A LITTLE
MOVING FROM LYING ON BACK TO SITTING ON SIDE OF FLAT BED WITH BEDRAILS: A LITTLE
TURNING FROM BACK TO SIDE WHILE IN FLAT BAD: A LITTLE
MOVING TO AND FROM BED TO CHAIR: A LITTLE
WALKING IN HOSPITAL ROOM: A LITTLE
MOVING FROM LYING ON BACK TO SITTING ON SIDE OF FLAT BED WITH BEDRAILS: A LITTLE
EATING MEALS: A LITTLE
MOBILITY SCORE: 18
CLIMB 3 TO 5 STEPS WITH RAILING: A LITTLE
TURNING FROM BACK TO SIDE WHILE IN FLAT BAD: A LITTLE
TOILETING: A LITTLE
STANDING UP FROM CHAIR USING ARMS: A LITTLE
HELP NEEDED FOR BATHING: A LITTLE
DRESSING REGULAR UPPER BODY CLOTHING: A LITTLE

## 2024-06-03 ASSESSMENT — PAIN - FUNCTIONAL ASSESSMENT
PAIN_FUNCTIONAL_ASSESSMENT: 0-10

## 2024-06-03 ASSESSMENT — PAIN DESCRIPTION - LOCATION
LOCATION: ARM
LOCATION: ARM

## 2024-06-03 ASSESSMENT — PAIN SCALES - GENERAL
PAINLEVEL_OUTOF10: 8
PAINLEVEL_OUTOF10: 7
PAINLEVEL_OUTOF10: 8
PAINLEVEL_OUTOF10: 8
PAINLEVEL_OUTOF10: 10 - WORST POSSIBLE PAIN

## 2024-06-03 ASSESSMENT — PAIN DESCRIPTION - ORIENTATION: ORIENTATION: LEFT

## 2024-06-03 ASSESSMENT — PAIN DESCRIPTION - DESCRIPTORS
DESCRIPTORS: ACHING;DISCOMFORT

## 2024-06-03 NOTE — PROGRESS NOTES
"Orthopaedic Surgery Progress Note    Subjective:  No acute events overnight. Having some numbness in fingers. Pain well controlled. Denies chest pain, shortness of breath, or fevers.    Objective:  /67 (BP Location: Right arm, Patient Position: Lying)   Pulse 106   Temp 35.9 °C (96.6 °F) (Temporal)   Resp 18   Ht 1.803 m (5' 11\")   Wt 136 kg (300 lb)   SpO2 98%   BMI 41.84 kg/m²     Gen: arousable, NAD, appropriately conversational  Cardiac: RRR to peripheral palpation  Resp: nonlabored on RA  GI: soft, nondistended    MSK:  LUE:   - splint in place, C/D/I  -Motor intact in axillary/AIN/PIN/ulnar distributions  -SILT axillary/radial distributions; somewhat decreased in median/ulnar distributions  -Hand wwp, 2+ radial pulse, cap refill brisk  -Compartments soft and compressible, no pain with passive stretch of digits      No results found for this or any previous visit (from the past 24 hour(s)).    FL less than 1 hour   Final Result      CT elbow left wo IV contrast   Final Result   1. Interval reduction of the posterior elbow dislocation. Ulnar   trochlea articulation is in anatomic alignment. Mildly displaced   coronoid fracture as well as comminuted and mildly displaced proximal   ulna diaphysis fracture.   2. Highly comminuted and impacted fracture of the radial head and   neck with a butterfly fracture fragment measuring 1.7 cm which   appears to be within the more medial  joint at the ulnar trochlear   articulation. The major radial head fragment appears to be   posteriorly subluxed relative to the capitellum.             I personally reviewed the images/study and I agree with the findings   as stated by Meri Caal MD. This study was interpreted at   [place], [place].        MACRO:   None        Signed by: Lai Odell 6/1/2024 2:41 AM   Dictation workstation:   [id number]      XR wrist left 3+ views   Final Result   No acute osseous " abnormality of the left wrist.        I personally reviewed the images/study and I agree with the findings   as stated by Meri Caal MD. This study was interpreted at   [place], [place].        MACRO:   None        Signed by: Lai Odell 6/1/2024 1:23 AM   Dictation workstation:   [id number]      XR elbow left 3+ views   Final Result   Interval reduction and splinting of the elbow with resolution of the   posterior dislocation. There is comminuted fracture of the radial   head as before. Additional fractures better visualized on prior.        I personally reviewed the images/study and I agree with the findings   as stated by Meri Caal MD. This study was interpreted at   [place], [place].        MACRO:   None        Signed by: Lai Odell 6/1/2024 1:18 AM   Dictation workstation:   [id number]      XR shoulder left 2+ views   Final Result   Comminuted fracture of the radial head with posterior dislocation of   the elbow joint. There is also avulsion/impaction fracture of the   ulna coronoid process. Comminuted mildly displaced fracture of the   proximal ulna diaphysis. This is a variation of Monteggia injury.             MACRO:   None        Signed by: Lai Odell 5/31/2024 8:48 PM   Dictation workstation:   [id number]      XR humerus left   Final Result   Comminuted fracture of the radial head with posterior dislocation of   the elbow joint. There is also avulsion/impaction fracture of the   ulna coronoid process. Comminuted mildly displaced fracture of the   proximal ulna diaphysis. This is a variation of Monteggia injury.             MACRO:   None        Signed by: Lai Odell 5/31/2024 8:48 PM   Dictation workstation:   [id number]      XR forearm left 2 views   Final Result   Comminuted fracture of the radial head with posterior dislocation of   the elbow joint. There is also  avulsion/impaction fracture of the   ulna coronoid process. Comminuted mildly displaced fracture of the   proximal ulna diaphysis. This is a variation of Monteggia injury.             MACRO:   None        Signed by: Lai Odell 5/31/2024 8:48 PM   Dictation workstation:   [id number]      XR elbow left 1-2 views   Final Result   Comminuted fracture of the radial head with posterior dislocation of   the elbow joint. There is also avulsion/impaction fracture of the   ulna coronoid process. Comminuted mildly displaced fracture of the   proximal ulna diaphysis. This is a variation of Monteggia injury.             MACRO:   None        Signed by: Lai Odell 5/31/2024 8:48 PM   Dictation workstation:   [id number]      XR chest 1 view   Final Result   1.  No evidence of acute cardiopulmonary process.             Signed by: Lai Odell 5/31/2024 8:48 PM   Dictation workstation:   [id number]      XR wrist left 1-2 views   Final Result   Comminuted fracture of the radial head with posterior dislocation of   the elbow joint. There is also avulsion/impaction fracture of the   ulna coronoid process. Comminuted mildly displaced fracture of the   proximal ulna diaphysis. This is a variation of Monteggia injury.             MACRO:   None        Signed by: Lai Odell 5/31/2024 8:48 PM   Dictation workstation:   [id number]      FL less than 1 hour    (Results Pending)       Assessment/Plan: 33M (Morbid Obesity, GERD) p/w above after fall from scooter w/  left monteggia fx/dislo w/ radial head/neck fx w/ suspected DRUJ injury. Closed, NVI. XR/CT w/ minimally displaced proximal ulna fx, coronoid fx, radial head/neck fracture dislocation and subluxation of the distal radial ulnar joint dorsally. Closed reduced under propofol sedation. STS-LAS. Post reduction XR/CT w/ radial neck fracture with displaced radial head fragment. Patient will require operative fixation.      Plan:  - OR today 6/3 with Dr Foley  - Weight  bearing: NWB LUE  - splint loosened on rounds with immediate relief in hand numbness  - Preop labs complete  - DVT ppx: SCDs, chemoppx held prior to OR  - Diet: NPO since midnight  - Pain: Tylenol, oxycodone 5/10, dilaudid breakthrough  - Lines: maintain PIVx2 while inpatient  - Bowel Regimen: Miralax, senna, dulcolax  - Pulm: Encourage IS, maintain O2 sat >92%  - Cardiac: no issues  - Glycemic: no issues  - Continue home medications  - [place]: none     Dispo: pending OR    Discussed with attending, Dr. Alaina Soliz MD  PGY-2 Orthopedic Surgery  [facility]  [employer]  Pager: [phone]    While admitted, this patient will be followed by the Ortho Trauma Team, available via Epic Chat weekdays 6a-6p. Please page [phone] on nights and weekends.    Ortho Trauma  First Call: Oscar Duran, PGY-1  Second Call: Nicolas Soliz, PGY-2  Third Call: Brooke Sanders, PGY-3

## 2024-06-03 NOTE — PROGRESS NOTES
Transitional Care Coordinator Note: Per medical team (ortho) patient is not medically ready, pending OR with ortho. Discharge dispo: Per ortho team patient will need outpatient OT post discharge. Ortho team informed TCC patient surgery rescheduled for 6/4. TCC updated bedside nurse of surgery cancellation and reschedule.       Viki Fang RN BSN   Transitional Care Coordinator

## 2024-06-03 NOTE — CARE PLAN
The patient's goals for the shift include      The clinical goals for the shift include BP WNL and pain control    Over the shift, the patient is making adequate progress towards stated care plan goals

## 2024-06-03 NOTE — H&P
[facility] Department of Orthopaedic Surgery   Surgical History & Physical <30 Days  06/03/24    Reason for Surgery: L monteggia fx, radial neck fx  Planned Procedure: ORIF ulna, ORIF/radial head replacement    History & Physical Reviewed:  I have reviewed the History and Physical for obtained within the last 30 days. Dated 6/1. Relevant findings and updates are noted below:  No significant changes.    Home medications were reviewed with significant updates noted below:  No significant changes.    ERAS patient?: No    COVID-19 Risk Consent:   Surgeon has reviewed the key risks related to bertram COVID-19 and subsequent sequelae.     Proceed with scheduled surgery.     06/03/24 at 12:02 AM - Evan Walden MD

## 2024-06-03 NOTE — CARE PLAN
Problem: Skin  Goal: Decreased wound size/increased tissue granulation at next dressing change  Flowsheets (Taken 6/3/2024 0011)  Decreased wound size/increased tissue granulation at next dressing change: Promote sleep for wound healing  Goal: Participates in plan/prevention/treatment measures  Flowsheets (Taken 6/3/2024 0011)  Participates in plan/prevention/treatment measures: Elevate heels  Goal: Prevent/minimize sheer/friction injuries  Flowsheets (Taken 6/3/2024 0011)  Prevent/minimize sheer/friction injuries:   Increase activity/out of bed for meals   HOB 30 degrees or less  Goal: Promote/optimize nutrition  Flowsheets (Taken 6/3/2024 0011)  Promote/optimize nutrition: Offer water/supplements/favorite foods   The patient's goals for the shift include      The clinical goals for the shift include Patient pain will be well-controled, his BP will be WNL by the end of this shift

## 2024-06-04 ENCOUNTER — APPOINTMENT (OUTPATIENT)
Dept: CARDIOLOGY | Facility: HOSPITAL | Age: 34
End: 2024-06-04

## 2024-06-04 ENCOUNTER — APPOINTMENT (OUTPATIENT)
Dept: RADIOLOGY | Facility: HOSPITAL | Age: 34
End: 2024-06-04

## 2024-06-04 PROBLEM — K21.9 GASTROESOPHAGEAL REFLUX DISEASE: Status: ACTIVE | Noted: 2024-06-04

## 2024-06-04 PROBLEM — E66.9 OBESITY: Status: ACTIVE | Noted: 2024-06-04

## 2024-06-04 PROBLEM — S52.271G: Status: ACTIVE | Noted: 2024-06-04

## 2024-06-04 LAB
ATRIAL RATE: 114 BPM
ATRIAL RATE: 118 BPM
P AXIS: 67 DEGREES
P AXIS: 67 DEGREES
P OFFSET: 200 MS
P OFFSET: 201 MS
P ONSET: 145 MS
P ONSET: 147 MS
PR INTERVAL: 150 MS
PR INTERVAL: 152 MS
Q ONSET: 221 MS
Q ONSET: 222 MS
QRS COUNT: 19 BEATS
QRS COUNT: 19 BEATS
QRS DURATION: 72 MS
QRS DURATION: 74 MS
QT INTERVAL: 346 MS
QT INTERVAL: 350 MS
QTC CALCULATION(BAZETT): 476 MS
QTC CALCULATION(BAZETT): 490 MS
QTC FREDERICIA: 428 MS
QTC FREDERICIA: 438 MS
R AXIS: 54 DEGREES
R AXIS: 57 DEGREES
T AXIS: -5 DEGREES
T AXIS: -9 DEGREES
T OFFSET: 394 MS
T OFFSET: 397 MS
VENTRICULAR RATE: 114 BPM
VENTRICULAR RATE: 118 BPM

## 2024-06-04 PROCEDURE — 24343 REPR ELBOW LAT LIGMNT W/TISS: CPT | Performed by: STUDENT IN AN ORGANIZED HEALTH CARE EDUCATION/TRAINING PROGRAM

## 2024-06-04 PROCEDURE — C1776 JOINT DEVICE (IMPLANTABLE): HCPCS | Performed by: ORTHOPAEDIC SURGERY

## 2024-06-04 PROCEDURE — 0PRJ0JZ REPLACEMENT OF LEFT RADIUS WITH SYNTHETIC SUBSTITUTE, OPEN APPROACH: ICD-10-PCS | Performed by: OBSTETRICS & GYNECOLOGY

## 2024-06-04 PROCEDURE — 0PSL04Z REPOSITION LEFT ULNA WITH INTERNAL FIXATION DEVICE, OPEN APPROACH: ICD-10-PCS | Performed by: OBSTETRICS & GYNECOLOGY

## 2024-06-04 PROCEDURE — 3600000003 HC OR TIME - INITIAL BASE CHARGE - PROCEDURE LEVEL THREE: Performed by: ORTHOPAEDIC SURGERY

## 2024-06-04 PROCEDURE — 3700000001 HC GENERAL ANESTHESIA TIME - INITIAL BASE CHARGE: Performed by: ORTHOPAEDIC SURGERY

## 2024-06-04 PROCEDURE — 2500000005 HC RX 250 GENERAL PHARMACY W/O HCPCS

## 2024-06-04 PROCEDURE — 99223 1ST HOSP IP/OBS HIGH 75: CPT

## 2024-06-04 PROCEDURE — A4217 STERILE WATER/SALINE, 500 ML: HCPCS | Performed by: STUDENT IN AN ORGANIZED HEALTH CARE EDUCATION/TRAINING PROGRAM

## 2024-06-04 PROCEDURE — 2500000004 HC RX 250 GENERAL PHARMACY W/ HCPCS (ALT 636 FOR OP/ED): Performed by: STUDENT IN AN ORGANIZED HEALTH CARE EDUCATION/TRAINING PROGRAM

## 2024-06-04 PROCEDURE — 0MQ40ZZ REPAIR LEFT ELBOW BURSA AND LIGAMENT, OPEN APPROACH: ICD-10-PCS | Performed by: OBSTETRICS & GYNECOLOGY

## 2024-06-04 PROCEDURE — 2500000001 HC RX 250 WO HCPCS SELF ADMINISTERED DRUGS (ALT 637 FOR MEDICARE OP): Performed by: STUDENT IN AN ORGANIZED HEALTH CARE EDUCATION/TRAINING PROGRAM

## 2024-06-04 PROCEDURE — 7100000011 HC EXTENDED STAY RECOVERY HOURLY - NURSING UNIT

## 2024-06-04 PROCEDURE — A4217 STERILE WATER/SALINE, 500 ML: HCPCS | Performed by: ORTHOPAEDIC SURGERY

## 2024-06-04 PROCEDURE — 2500000004 HC RX 250 GENERAL PHARMACY W/ HCPCS (ALT 636 FOR OP/ED)

## 2024-06-04 PROCEDURE — C1713 ANCHOR/SCREW BN/BN,TIS/BN: HCPCS | Performed by: ORTHOPAEDIC SURGERY

## 2024-06-04 PROCEDURE — 2500000001 HC RX 250 WO HCPCS SELF ADMINISTERED DRUGS (ALT 637 FOR MEDICARE OP)

## 2024-06-04 PROCEDURE — 7100000001 HC RECOVERY ROOM TIME - INITIAL BASE CHARGE: Performed by: ORTHOPAEDIC SURGERY

## 2024-06-04 PROCEDURE — 2500000004 HC RX 250 GENERAL PHARMACY W/ HCPCS (ALT 636 FOR OP/ED): Performed by: ORTHOPAEDIC SURGERY

## 2024-06-04 PROCEDURE — 2720000007 HC OR 272 NO HCPCS: Performed by: ORTHOPAEDIC SURGERY

## 2024-06-04 PROCEDURE — 76942 ECHO GUIDE FOR BIOPSY: CPT

## 2024-06-04 PROCEDURE — P9045 ALBUMIN (HUMAN), 5%, 250 ML: HCPCS | Mod: JZ

## 2024-06-04 PROCEDURE — 1100000001 HC PRIVATE ROOM DAILY

## 2024-06-04 PROCEDURE — A4649 SURGICAL SUPPLIES: HCPCS | Performed by: ORTHOPAEDIC SURGERY

## 2024-06-04 PROCEDURE — 24587 OPTX PARTCLR FX&/DSLC ELBW W: CPT | Performed by: STUDENT IN AN ORGANIZED HEALTH CARE EDUCATION/TRAINING PROGRAM

## 2024-06-04 PROCEDURE — 2780000003 HC OR 278 NO HCPCS: Performed by: ORTHOPAEDIC SURGERY

## 2024-06-04 PROCEDURE — 93010 ELECTROCARDIOGRAM REPORT: CPT | Performed by: INTERNAL MEDICINE

## 2024-06-04 PROCEDURE — 93005 ELECTROCARDIOGRAM TRACING: CPT

## 2024-06-04 PROCEDURE — 7100000002 HC RECOVERY ROOM TIME - EACH INCREMENTAL 1 MINUTE: Performed by: ORTHOPAEDIC SURGERY

## 2024-06-04 PROCEDURE — RXMED WILLOW AMBULATORY MEDICATION CHARGE

## 2024-06-04 PROCEDURE — 3700000002 HC GENERAL ANESTHESIA TIME - EACH INCREMENTAL 1 MINUTE: Performed by: ORTHOPAEDIC SURGERY

## 2024-06-04 PROCEDURE — 3600000008 HC OR TIME - EACH INCREMENTAL 1 MINUTE - PROCEDURE LEVEL THREE: Performed by: ORTHOPAEDIC SURGERY

## 2024-06-04 PROCEDURE — 2500000005 HC RX 250 GENERAL PHARMACY W/O HCPCS: Performed by: STUDENT IN AN ORGANIZED HEALTH CARE EDUCATION/TRAINING PROGRAM

## 2024-06-04 PROCEDURE — 99232 SBSQ HOSP IP/OBS MODERATE 35: CPT

## 2024-06-04 PROCEDURE — 2500000004 HC RX 250 GENERAL PHARMACY W/ HCPCS (ALT 636 FOR OP/ED): Performed by: ANESTHESIOLOGY

## 2024-06-04 DEVICE — IMPLANTABLE DEVICE: Type: IMPLANTABLE DEVICE | Site: ARM | Status: FUNCTIONAL

## 2024-06-04 DEVICE — SCREW, CORT 3.5 X 18 TI: Type: IMPLANTABLE DEVICE | Site: ARM | Status: FUNCTIONAL

## 2024-06-04 DEVICE — IMPLANTABLE DEVICE
Type: IMPLANTABLE DEVICE | Site: ARM | Status: FUNCTIONAL
Brand: EVOLVE

## 2024-06-04 DEVICE — SCREW, CORT 3.5 X 16 TI: Type: IMPLANTABLE DEVICE | Site: ARM | Status: FUNCTIONAL

## 2024-06-04 RX ORDER — SODIUM CHLORIDE, SODIUM GLUCONATE, SODIUM ACETATE, POTASSIUM CHLORIDE AND MAGNESIUM CHLORIDE 30; 37; 368; 526; 502 MG/100ML; MG/100ML; MG/100ML; MG/100ML; MG/100ML
INJECTION, SOLUTION INTRAVENOUS CONTINUOUS PRN
Status: DISCONTINUED | OUTPATIENT
Start: 2024-06-04 | End: 2024-06-04

## 2024-06-04 RX ORDER — HYDRALAZINE HYDROCHLORIDE 20 MG/ML
10 INJECTION INTRAMUSCULAR; INTRAVENOUS ONCE
Status: COMPLETED | OUTPATIENT
Start: 2024-06-04 | End: 2024-06-04

## 2024-06-04 RX ORDER — AMOXICILLIN 250 MG
2 CAPSULE ORAL 2 TIMES DAILY
Status: DISCONTINUED | OUTPATIENT
Start: 2024-06-04 | End: 2024-06-04

## 2024-06-04 RX ORDER — OXYCODONE HYDROCHLORIDE 5 MG/1
10 TABLET ORAL EVERY 4 HOURS PRN
Status: DISCONTINUED | OUTPATIENT
Start: 2024-06-04 | End: 2024-06-06 | Stop reason: HOSPADM

## 2024-06-04 RX ORDER — SODIUM CHLORIDE 0.9 G/100ML
IRRIGANT IRRIGATION AS NEEDED
Status: DISCONTINUED | OUTPATIENT
Start: 2024-06-04 | End: 2024-06-04 | Stop reason: HOSPADM

## 2024-06-04 RX ORDER — ONDANSETRON HYDROCHLORIDE 2 MG/ML
INJECTION, SOLUTION INTRAVENOUS AS NEEDED
Status: DISCONTINUED | OUTPATIENT
Start: 2024-06-04 | End: 2024-06-04

## 2024-06-04 RX ORDER — LIDOCAINE HYDROCHLORIDE 10 MG/ML
0.1 INJECTION INFILTRATION; PERINEURAL ONCE
Status: DISCONTINUED | OUTPATIENT
Start: 2024-06-04 | End: 2024-06-04 | Stop reason: HOSPADM

## 2024-06-04 RX ORDER — PROPOFOL 10 MG/ML
INJECTION, EMULSION INTRAVENOUS AS NEEDED
Status: DISCONTINUED | OUTPATIENT
Start: 2024-06-04 | End: 2024-06-04

## 2024-06-04 RX ORDER — GLYCOPYRROLATE 0.2 MG/ML
INJECTION INTRAMUSCULAR; INTRAVENOUS AS NEEDED
Status: DISCONTINUED | OUTPATIENT
Start: 2024-06-04 | End: 2024-06-04

## 2024-06-04 RX ORDER — LABETALOL HYDROCHLORIDE 5 MG/ML
INJECTION, SOLUTION INTRAVENOUS AS NEEDED
Status: DISCONTINUED | OUTPATIENT
Start: 2024-06-04 | End: 2024-06-04

## 2024-06-04 RX ORDER — MIDAZOLAM HYDROCHLORIDE 1 MG/ML
INJECTION INTRAMUSCULAR; INTRAVENOUS AS NEEDED
Status: DISCONTINUED | OUTPATIENT
Start: 2024-06-04 | End: 2024-06-04

## 2024-06-04 RX ORDER — HYDROMORPHONE HYDROCHLORIDE 1 MG/ML
0.5 INJECTION, SOLUTION INTRAMUSCULAR; INTRAVENOUS; SUBCUTANEOUS EVERY 5 MIN PRN
Status: DISCONTINUED | OUTPATIENT
Start: 2024-06-04 | End: 2024-06-04 | Stop reason: HOSPADM

## 2024-06-04 RX ORDER — NALOXONE HYDROCHLORIDE 0.4 MG/ML
0.2 INJECTION, SOLUTION INTRAMUSCULAR; INTRAVENOUS; SUBCUTANEOUS EVERY 5 MIN PRN
Status: DISCONTINUED | OUTPATIENT
Start: 2024-06-04 | End: 2024-06-06 | Stop reason: HOSPADM

## 2024-06-04 RX ORDER — DROPERIDOL 2.5 MG/ML
0.62 INJECTION, SOLUTION INTRAMUSCULAR; INTRAVENOUS ONCE AS NEEDED
Status: DISCONTINUED | OUTPATIENT
Start: 2024-06-04 | End: 2024-06-04 | Stop reason: HOSPADM

## 2024-06-04 RX ORDER — ONDANSETRON 4 MG/1
4 TABLET, FILM COATED ORAL EVERY 8 HOURS PRN
Status: DISCONTINUED | OUTPATIENT
Start: 2024-06-04 | End: 2024-06-06 | Stop reason: HOSPADM

## 2024-06-04 RX ORDER — HYDRALAZINE HYDROCHLORIDE 20 MG/ML
5 INJECTION INTRAMUSCULAR; INTRAVENOUS EVERY 30 MIN PRN
Status: DISCONTINUED | OUTPATIENT
Start: 2024-06-04 | End: 2024-06-04 | Stop reason: HOSPADM

## 2024-06-04 RX ORDER — METOPROLOL TARTRATE 1 MG/ML
5 INJECTION, SOLUTION INTRAVENOUS ONCE
Status: COMPLETED | OUTPATIENT
Start: 2024-06-04 | End: 2024-06-04

## 2024-06-04 RX ORDER — HYDRALAZINE HYDROCHLORIDE 20 MG/ML
5 INJECTION INTRAMUSCULAR; INTRAVENOUS EVERY 6 HOURS PRN
Status: DISCONTINUED | OUTPATIENT
Start: 2024-06-04 | End: 2024-06-06

## 2024-06-04 RX ORDER — HYDROMORPHONE HYDROCHLORIDE 1 MG/ML
0.5 INJECTION, SOLUTION INTRAMUSCULAR; INTRAVENOUS; SUBCUTANEOUS EVERY 2 HOUR PRN
Status: DISCONTINUED | OUTPATIENT
Start: 2024-06-04 | End: 2024-06-05

## 2024-06-04 RX ORDER — ROCURONIUM BROMIDE 10 MG/ML
INJECTION, SOLUTION INTRAVENOUS AS NEEDED
Status: DISCONTINUED | OUTPATIENT
Start: 2024-06-04 | End: 2024-06-04

## 2024-06-04 RX ORDER — OXYCODONE HYDROCHLORIDE 5 MG/1
5 TABLET ORAL EVERY 4 HOURS PRN
Status: DISCONTINUED | OUTPATIENT
Start: 2024-06-04 | End: 2024-06-04 | Stop reason: HOSPADM

## 2024-06-04 RX ORDER — FERROUS SULFATE, DRIED 160(50) MG
1 TABLET, EXTENDED RELEASE ORAL DAILY
Qty: 30 TABLET | Refills: 0 | Status: SHIPPED | OUTPATIENT
Start: 2024-06-04 | End: 2024-07-06

## 2024-06-04 RX ORDER — MAGNESIUM SULFATE HEPTAHYDRATE 500 MG/ML
INJECTION, SOLUTION INTRAMUSCULAR; INTRAVENOUS AS NEEDED
Status: DISCONTINUED | OUTPATIENT
Start: 2024-06-04 | End: 2024-06-04

## 2024-06-04 RX ORDER — POLYETHYLENE GLYCOL 3350 17 G/17G
17 POWDER, FOR SOLUTION ORAL DAILY
Status: DISCONTINUED | OUTPATIENT
Start: 2024-06-04 | End: 2024-06-04

## 2024-06-04 RX ORDER — METOPROLOL TARTRATE 1 MG/ML
INJECTION, SOLUTION INTRAVENOUS AS NEEDED
Status: DISCONTINUED | OUTPATIENT
Start: 2024-06-04 | End: 2024-06-04

## 2024-06-04 RX ORDER — PHENYLEPHRINE HCL IN 0.9% NACL 0.4MG/10ML
SYRINGE (ML) INTRAVENOUS AS NEEDED
Status: DISCONTINUED | OUTPATIENT
Start: 2024-06-04 | End: 2024-06-04

## 2024-06-04 RX ORDER — HYDRALAZINE HYDROCHLORIDE 20 MG/ML
5 INJECTION INTRAMUSCULAR; INTRAVENOUS EVERY 8 HOURS PRN
Status: DISCONTINUED | OUTPATIENT
Start: 2024-06-04 | End: 2024-06-04

## 2024-06-04 RX ORDER — DIPHENHYDRAMINE HCL 12.5MG/5ML
12.5 LIQUID (ML) ORAL EVERY 6 HOURS PRN
Status: DISCONTINUED | OUTPATIENT
Start: 2024-06-04 | End: 2024-06-04

## 2024-06-04 RX ORDER — FENTANYL CITRATE 50 UG/ML
INJECTION, SOLUTION INTRAMUSCULAR; INTRAVENOUS AS NEEDED
Status: DISCONTINUED | OUTPATIENT
Start: 2024-06-04 | End: 2024-06-04

## 2024-06-04 RX ORDER — ACETAMINOPHEN 325 MG/1
650 TABLET ORAL EVERY 6 HOURS SCHEDULED
Status: DISCONTINUED | OUTPATIENT
Start: 2024-06-04 | End: 2024-06-04

## 2024-06-04 RX ORDER — ASPIRIN 81 MG/1
81 TABLET ORAL 2 TIMES DAILY
Qty: 60 TABLET | Refills: 0 | Status: SHIPPED | OUTPATIENT
Start: 2024-06-04 | End: 2024-07-06

## 2024-06-04 RX ORDER — CEFAZOLIN 1 G/1
INJECTION, POWDER, FOR SOLUTION INTRAVENOUS AS NEEDED
Status: DISCONTINUED | OUTPATIENT
Start: 2024-06-04 | End: 2024-06-04

## 2024-06-04 RX ORDER — ACETAMINOPHEN 325 MG/1
650 TABLET ORAL EVERY 4 HOURS PRN
Status: DISCONTINUED | OUTPATIENT
Start: 2024-06-04 | End: 2024-06-04 | Stop reason: HOSPADM

## 2024-06-04 RX ORDER — OXYCODONE HYDROCHLORIDE 5 MG/1
5 TABLET ORAL EVERY 6 HOURS PRN
Qty: 28 TABLET | Refills: 0 | Status: SHIPPED | OUTPATIENT
Start: 2024-06-04 | End: 2024-06-13

## 2024-06-04 RX ORDER — ALBUMIN HUMAN 50 G/1000ML
SOLUTION INTRAVENOUS AS NEEDED
Status: DISCONTINUED | OUTPATIENT
Start: 2024-06-04 | End: 2024-06-04

## 2024-06-04 RX ORDER — HYDROMORPHONE HYDROCHLORIDE 1 MG/ML
INJECTION, SOLUTION INTRAMUSCULAR; INTRAVENOUS; SUBCUTANEOUS AS NEEDED
Status: DISCONTINUED | OUTPATIENT
Start: 2024-06-04 | End: 2024-06-04

## 2024-06-04 RX ORDER — ASPIRIN 81 MG/1
81 TABLET ORAL 2 TIMES DAILY
Status: DISCONTINUED | OUTPATIENT
Start: 2024-06-04 | End: 2024-06-06 | Stop reason: HOSPADM

## 2024-06-04 RX ORDER — OXYCODONE HYDROCHLORIDE 5 MG/1
5 TABLET ORAL EVERY 4 HOURS PRN
Status: DISCONTINUED | OUTPATIENT
Start: 2024-06-04 | End: 2024-06-06 | Stop reason: HOSPADM

## 2024-06-04 RX ORDER — ESMOLOL HYDROCHLORIDE 10 MG/ML
INJECTION INTRAVENOUS AS NEEDED
Status: DISCONTINUED | OUTPATIENT
Start: 2024-06-04 | End: 2024-06-04

## 2024-06-04 RX ORDER — ONDANSETRON HYDROCHLORIDE 2 MG/ML
4 INJECTION, SOLUTION INTRAVENOUS ONCE AS NEEDED
Status: DISCONTINUED | OUTPATIENT
Start: 2024-06-04 | End: 2024-06-04 | Stop reason: HOSPADM

## 2024-06-04 RX ORDER — LIDOCAINE HCL/PF 100 MG/5ML
SYRINGE (ML) INTRAVENOUS AS NEEDED
Status: DISCONTINUED | OUTPATIENT
Start: 2024-06-04 | End: 2024-06-04

## 2024-06-04 RX ORDER — TRANEXAMIC ACID 100 MG/ML
INJECTION, SOLUTION INTRAVENOUS AS NEEDED
Status: DISCONTINUED | OUTPATIENT
Start: 2024-06-04 | End: 2024-06-04

## 2024-06-04 RX ORDER — ONDANSETRON HYDROCHLORIDE 2 MG/ML
4 INJECTION, SOLUTION INTRAVENOUS EVERY 8 HOURS PRN
Status: DISCONTINUED | OUTPATIENT
Start: 2024-06-04 | End: 2024-06-06 | Stop reason: HOSPADM

## 2024-06-04 RX ORDER — DOCUSATE SODIUM 100 MG/1
100 CAPSULE, LIQUID FILLED ORAL 2 TIMES DAILY
Qty: 60 CAPSULE | Refills: 0 | Status: SHIPPED | OUTPATIENT
Start: 2024-06-04 | End: 2024-07-06

## 2024-06-04 RX ORDER — SODIUM CHLORIDE, SODIUM LACTATE, POTASSIUM CHLORIDE, CALCIUM CHLORIDE 600; 310; 30; 20 MG/100ML; MG/100ML; MG/100ML; MG/100ML
100 INJECTION, SOLUTION INTRAVENOUS CONTINUOUS
Status: DISCONTINUED | OUTPATIENT
Start: 2024-06-04 | End: 2024-06-04 | Stop reason: HOSPADM

## 2024-06-04 RX ORDER — VANCOMYCIN HYDROCHLORIDE 1 G/20ML
INJECTION, POWDER, LYOPHILIZED, FOR SOLUTION INTRAVENOUS AS NEEDED
Status: DISCONTINUED | OUTPATIENT
Start: 2024-06-04 | End: 2024-06-04 | Stop reason: HOSPADM

## 2024-06-04 RX ADMIN — ESMOLOL HYDROCHLORIDE 30 MG: 100 INJECTION, SOLUTION INTRAVENOUS at 09:26

## 2024-06-04 RX ADMIN — SODIUM CHLORIDE, SODIUM GLUCONATE, SODIUM ACETATE, POTASSIUM CHLORIDE AND MAGNESIUM CHLORIDE: 526; 502; 368; 37; 30 INJECTION, SOLUTION INTRAVENOUS at 07:11

## 2024-06-04 RX ADMIN — OXYCODONE HYDROCHLORIDE 10 MG: 5 TABLET ORAL at 22:36

## 2024-06-04 RX ADMIN — HYDROMORPHONE HYDROCHLORIDE 0.5 MG: 1 INJECTION, SOLUTION INTRAMUSCULAR; INTRAVENOUS; SUBCUTANEOUS at 11:12

## 2024-06-04 RX ADMIN — ACETAMINOPHEN 650 MG: 325 TABLET ORAL at 17:40

## 2024-06-04 RX ADMIN — ACETAMINOPHEN 650 MG: 325 TABLET ORAL at 05:49

## 2024-06-04 RX ADMIN — ASPIRIN 81 MG: 81 TABLET, COATED ORAL at 21:03

## 2024-06-04 RX ADMIN — PROPOFOL 70 MG: 10 INJECTION, EMULSION INTRAVENOUS at 09:42

## 2024-06-04 RX ADMIN — MAGNESIUM SULFATE HEPTAHYDRATE 2 G: 500 INJECTION, SOLUTION INTRAMUSCULAR; INTRAVENOUS at 08:50

## 2024-06-04 RX ADMIN — LABETALOL HYDROCHLORIDE 5 MG: 5 INJECTION INTRAVENOUS at 09:21

## 2024-06-04 RX ADMIN — ESMOLOL HYDROCHLORIDE 40 MG: 100 INJECTION, SOLUTION INTRAVENOUS at 09:18

## 2024-06-04 RX ADMIN — LABETALOL HYDROCHLORIDE 5 MG: 5 INJECTION INTRAVENOUS at 09:34

## 2024-06-04 RX ADMIN — HYDROMORPHONE HYDROCHLORIDE 0.1 MG: 1 INJECTION, SOLUTION INTRAMUSCULAR; INTRAVENOUS; SUBCUTANEOUS at 09:02

## 2024-06-04 RX ADMIN — ROCURONIUM 20 MG: 50 INJECTION, SOLUTION INTRAVENOUS at 09:40

## 2024-06-04 RX ADMIN — ONDANSETRON 4 MG: 2 INJECTION, SOLUTION INTRAMUSCULAR; INTRAVENOUS at 09:50

## 2024-06-04 RX ADMIN — FENTANYL CITRATE 100 MCG: 50 INJECTION, SOLUTION INTRAMUSCULAR; INTRAVENOUS at 07:37

## 2024-06-04 RX ADMIN — ESMOLOL HYDROCHLORIDE 40 MG: 100 INJECTION, SOLUTION INTRAVENOUS at 09:10

## 2024-06-04 RX ADMIN — LABETALOL HYDROCHLORIDE 10 MG: 5 INJECTION INTRAVENOUS at 09:16

## 2024-06-04 RX ADMIN — CEFAZOLIN 3 G: 1 INJECTION, POWDER, FOR SOLUTION INTRAMUSCULAR; INTRAVENOUS at 07:55

## 2024-06-04 RX ADMIN — ROCURONIUM 20 MG: 50 INJECTION, SOLUTION INTRAVENOUS at 09:10

## 2024-06-04 RX ADMIN — LABETALOL HYDROCHLORIDE 5 MG: 5 INJECTION INTRAVENOUS at 10:12

## 2024-06-04 RX ADMIN — ALBUMIN (HUMAN) 250 ML: 12.5 SOLUTION INTRAVENOUS at 08:04

## 2024-06-04 RX ADMIN — MIDAZOLAM HYDROCHLORIDE 2 MG: 1 INJECTION, SOLUTION INTRAMUSCULAR; INTRAVENOUS at 07:23

## 2024-06-04 RX ADMIN — HYDRALAZINE HYDROCHLORIDE 5 MG: 20 INJECTION INTRAMUSCULAR; INTRAVENOUS at 17:41

## 2024-06-04 RX ADMIN — HYDRALAZINE HYDROCHLORIDE 5 MG: 20 INJECTION INTRAMUSCULAR; INTRAVENOUS at 12:00

## 2024-06-04 RX ADMIN — DEXAMETHASONE SODIUM PHOSPHATE 4 MG: 4 INJECTION INTRA-ARTICULAR; INTRALESIONAL; INTRAMUSCULAR; INTRAVENOUS; SOFT TISSUE at 07:55

## 2024-06-04 RX ADMIN — METOPROLOL TARTRATE 5 MG: 1 INJECTION, SOLUTION INTRAVENOUS at 07:42

## 2024-06-04 RX ADMIN — Medication 80 MCG: at 08:15

## 2024-06-04 RX ADMIN — SUGAMMADEX 400 MG: 100 INJECTION, SOLUTION INTRAVENOUS at 10:35

## 2024-06-04 RX ADMIN — Medication 2 L/MIN: at 14:15

## 2024-06-04 RX ADMIN — OXYCODONE HYDROCHLORIDE 10 MG: 5 TABLET ORAL at 17:39

## 2024-06-04 RX ADMIN — TRANEXAMIC ACID 1000 MG: 100 INJECTION INTRAVENOUS at 07:55

## 2024-06-04 RX ADMIN — HYDRALAZINE HYDROCHLORIDE 10 MG: 20 INJECTION INTRAMUSCULAR; INTRAVENOUS at 12:25

## 2024-06-04 RX ADMIN — ESMOLOL HYDROCHLORIDE 30 MG: 100 INJECTION, SOLUTION INTRAVENOUS at 08:40

## 2024-06-04 RX ADMIN — ESMOLOL HYDROCHLORIDE 30 MG: 100 INJECTION, SOLUTION INTRAVENOUS at 08:46

## 2024-06-04 RX ADMIN — ESMOLOL HYDROCHLORIDE 30 MG: 100 INJECTION, SOLUTION INTRAVENOUS at 09:38

## 2024-06-04 RX ADMIN — Medication 20 MG: at 08:52

## 2024-06-04 RX ADMIN — OXYCODONE HYDROCHLORIDE 10 MG: 5 TABLET ORAL at 05:49

## 2024-06-04 RX ADMIN — ROCURONIUM 100 MG: 50 INJECTION, SOLUTION INTRAVENOUS at 07:38

## 2024-06-04 RX ADMIN — ROCURONIUM 10 MG: 50 INJECTION, SOLUTION INTRAVENOUS at 08:10

## 2024-06-04 RX ADMIN — GLYCOPYRROLATE 0.2 MG: 0.2 INJECTION INTRAMUSCULAR; INTRAVENOUS at 07:33

## 2024-06-04 RX ADMIN — SODIUM CHLORIDE, POTASSIUM CHLORIDE, SODIUM LACTATE AND CALCIUM CHLORIDE 50 ML/HR: 600; 310; 30; 20 INJECTION, SOLUTION INTRAVENOUS at 17:44

## 2024-06-04 RX ADMIN — HYDROMORPHONE HYDROCHLORIDE 0.5 MG: 1 INJECTION, SOLUTION INTRAMUSCULAR; INTRAVENOUS; SUBCUTANEOUS at 11:25

## 2024-06-04 RX ADMIN — HYDROMORPHONE HYDROCHLORIDE 0.4 MG: 1 INJECTION, SOLUTION INTRAMUSCULAR; INTRAVENOUS; SUBCUTANEOUS at 08:30

## 2024-06-04 RX ADMIN — LIDOCAINE HYDROCHLORIDE 60 MG: 20 INJECTION, SOLUTION INTRAVENOUS at 07:37

## 2024-06-04 RX ADMIN — HYDROMORPHONE HYDROCHLORIDE 0.1 MG: 1 INJECTION, SOLUTION INTRAMUSCULAR; INTRAVENOUS; SUBCUTANEOUS at 08:51

## 2024-06-04 RX ADMIN — HYDROMORPHONE HYDROCHLORIDE 0.4 MG: 1 INJECTION, SOLUTION INTRAMUSCULAR; INTRAVENOUS; SUBCUTANEOUS at 10:25

## 2024-06-04 RX ADMIN — Medication 10 MG: at 09:50

## 2024-06-04 RX ADMIN — CEFAZOLIN 3 G: 10 INJECTION, POWDER, FOR SOLUTION INTRAVENOUS at 22:32

## 2024-06-04 RX ADMIN — PROPOFOL 200 MG: 10 INJECTION, EMULSION INTRAVENOUS at 07:37

## 2024-06-04 RX ADMIN — METOPROLOL TARTRATE 5 MG: 1 INJECTION, SOLUTION INTRAVENOUS at 13:32

## 2024-06-04 RX ADMIN — Medication 70 MG: at 07:55

## 2024-06-04 RX ADMIN — SODIUM CHLORIDE, POTASSIUM CHLORIDE, SODIUM LACTATE AND CALCIUM CHLORIDE 100 ML/HR: 600; 310; 30; 20 INJECTION, SOLUTION INTRAVENOUS at 13:47

## 2024-06-04 RX ADMIN — Medication 2 L/MIN: at 10:45

## 2024-06-04 SDOH — HEALTH STABILITY: MENTAL HEALTH: CURRENT SMOKER: 1

## 2024-06-04 ASSESSMENT — PAIN - FUNCTIONAL ASSESSMENT
PAIN_FUNCTIONAL_ASSESSMENT: 0-10
PAIN_FUNCTIONAL_ASSESSMENT: UNABLE TO SELF-REPORT
PAIN_FUNCTIONAL_ASSESSMENT: 0-10
PAIN_FUNCTIONAL_ASSESSMENT: UNABLE TO SELF-REPORT
PAIN_FUNCTIONAL_ASSESSMENT: 0-10

## 2024-06-04 ASSESSMENT — PAIN SCALES - GENERAL
PAINLEVEL_OUTOF10: 0 - NO PAIN
PAINLEVEL_OUTOF10: 0 - NO PAIN
PAINLEVEL_OUTOF10: 8
PAINLEVEL_OUTOF10: 8
PAINLEVEL_OUTOF10: 0 - NO PAIN
PAINLEVEL_OUTOF10: 8
PAINLEVEL_OUTOF10: 7
PAINLEVEL_OUTOF10: 4
PAINLEVEL_OUTOF10: 7
PAINLEVEL_OUTOF10: 0 - NO PAIN
PAINLEVEL_OUTOF10: 4
PAINLEVEL_OUTOF10: 0 - NO PAIN
PAINLEVEL_OUTOF10: 8
PAINLEVEL_OUTOF10: 8
PAINLEVEL_OUTOF10: 0 - NO PAIN
PAINLEVEL_OUTOF10: 4
PAINLEVEL_OUTOF10: 7
PAINLEVEL_OUTOF10: 0 - NO PAIN

## 2024-06-04 ASSESSMENT — PAIN DESCRIPTION - DESCRIPTORS: DESCRIPTORS: ACHING;DISCOMFORT

## 2024-06-04 NOTE — ANESTHESIA POSTPROCEDURE EVALUATION
Patient: Herberth Brandt    Procedure Summary       Date: 06/04/24 Room / Location: [facility] OR 01 / [facility] OR    Anesthesia Start: 0723 Anesthesia Stop: 1046    Procedure: Open Reduction Internal Fixation Radius and Ulna (Left: Arm Lower) Diagnosis:       Closed Monteggia's fracture of left ulna, initial encounter      (Closed Monteggia's fracture of left ulna, initial encounter [S52.272A])    Surgeons: William Foley MD Responsible Provider: JAVIER Gifford    Anesthesia Type: general, regional ASA Status: 3            Anesthesia Type: general, regional    Vitals Value Taken Time   /90 06/04/24 1046   Temp 36 06/04/24 1046   Pulse 94 06/04/24 1046   Resp 14 06/04/24 1046   SpO2 98 06/04/24 1046       Anesthesia Post Evaluation    Patient location during evaluation: PACU  Patient participation: complete - patient participated  Level of consciousness: awake and alert  Pain management: satisfactory to patient  Airway patency: patent  Two or more strategies used to mitigate risk of obstructive sleep apnea  Cardiovascular status: acceptable and blood pressure returned to baseline  Respiratory status: acceptable and face mask  Hydration status: acceptable  Postoperative Nausea and Vomiting: none      There were no known notable events for this encounter.

## 2024-06-04 NOTE — PROGRESS NOTES
Transitional Care Coordinator Note: Patient discussed in morning rounds, per medical team (ortho) patient is not medically ready. Patient is POD #0. Discharge dispo: Per trauma team plan for patient to discharge home no needs and start outpatient therapy after first post op visit.       Viki Fang RN BSN   Transitional Care Coordinator

## 2024-06-04 NOTE — CONSULTS
Herberth Brandt is a 33 y.o. year old male patient who presents for L radial and ulnar ORIF with Dr. Foley on 6/4. Acute Pain consulted for block for postoperative pain control. Post op supraclavicular block performed 6.4.    Anticipated Postop Pain Issues -   Palliative: typically relieved with IV analgesics and regional local anesthetics  Provocative: typically with movement  Quality: typically burning and aching  Radiation: typically none  Severity: typically severe 8-10/10  Timing: typically constant    History reviewed. No pertinent past medical history.     History reviewed. No pertinent surgical history.     No family history on file.     Social History     Socioeconomic History    Marital status: Single     Spouse name: Not on file    Number of children: Not on file    Years of education: Not on file    Highest education level: Not on file   Occupational History    Not on file   Tobacco Use    Smoking status: Never     Passive exposure: Never    Smokeless tobacco: Never   Vaping Use    Vaping status: Never Used   Substance and Sexual Activity    Alcohol use: Not Currently    Drug use: Yes     Types: Marijuana    Sexual activity: Not on file   Other Topics Concern    Not on file   Social History Narrative    Not on file     Social Determinants of Health     Financial Resource Strain: Low Risk  (6/1/2024)    Overall Financial Resource Strain (CARDIA)     Difficulty of Paying Living Expenses: Not hard at all   Food Insecurity: Not on File (11/18/2019)    Received from [facility]     Food Insecurity     Food: 0   Transportation Needs: No Transportation Needs (6/1/2024)    PRAPARE - Transportation     Lack of Transportation (Medical): No     Lack of Transportation (Non-Medical): No   Physical Activity: Not on File (11/18/2019)    Received from [facility]     Physical Activity     Physical Activity: 0   Stress: Not on File (11/18/2019)    Received from [facility]     Stress     Stress: 0   Social Connections: Not on File  (11/18/2019)    Received from [facility]     Social Connections and Isolation: 0   Intimate Partner Violence: Not on file   Housing Stability: Low Risk  (6/1/2024)    Housing Stability Vital Sign     Unable to Pay for Housing in the Last Year: No     Number of Places Lived in the Last Year: 1     Unstable Housing in the Last Year: No        No Known Allergies      Review of Systems  Gen: No fatigue, anorexia, insomnia, fever.   Eyes: No vision loss, double vision, drainage, eye pain.   ENT: No pharyngitis, dry mouth, no hearing changes or ear discharge  Cardiac: No chest pain, palpitations, syncope, near syncope.   Pulmonary: No shortness of breath, cough, hemoptysis.   Heme/lymph: No swollen glands, fever, bleeding.   GI: No abdominal pain, change in bowel habits, melena, hematemesis, hematochezia, nausea, vomiting, diarrhea.   : No discharge, dysuria, frequency, urgency, hematuria.  Endo: No polyuria or weight loss.   Musculoskeletal: Negative for any pain or loss of ROM/weakness  Skin: No rashes or lesions  Neuro: Normal speech, no numbness or weakness. No gait difficulties  Review of systems is otherwise negative unless stated above or in history of present illness.    Physical Exam:  Constitutional:  no distress, alert and cooperative  Eyes: clear sclera  Head/Neck: No apparent injury, trachea midline  Respiratory/Thorax: Patent airways, thorax symmetric, breathing comfortably  Cardiovascular: no pitting edema  Gastrointestinal: Nondistended  Musculoskeletal: ROM intact  Extremities: no clubbing  Neurological: alert, clayton x4  Psychological: Appropriate affect    No results found for this or any previous visit (from the past 24 hour(s)).       Herberth Brandt is a 33 y.o. year old male patient who presents for L radial and ulnar ORIF with Dr. Foley on 6/4. Acute Pain consulted for block for postoperative pain control. Post op supraclavicular block performed 6.4.    Plan:    - L Supraclavicular  blocks performed post-operatively on 6/4  - Rest of pain management per primary team  - Acute pain service will follow     Acute Pain Resident  pg [phone]  [phone]

## 2024-06-04 NOTE — BRIEF OP NOTE
Date: 2024 - 2024  OR Location: [facility] OR    Name: Herberth Brandt, : 1990, Age: 33 y.o., MRN: [id number], Sex: male    Diagnosis  Pre-op Diagnosis     * Closed Monteggia's fracture of left ulna, initial encounter [S52.769A] Post-op Diagnosis     * Closed Monteggia's fracture of left ulna, initial encounter [O22.723A]     Procedures  Open Reduction Internal Fixation Radius and Ulna  26828 - HI OPEN TX RADIAL&ULNAR SHAFT FX W/FIXJ RADIUS&ULNA    Open Reduction Internal Fixation Radius and Ulna  07306 - HI OPEN TX RADIAL&ULNAR SHAFT FX W/FIXJ RADIUS&ULNA      Surgeons      * William Foley - Primary    Resident/Fellow/Other Assistant:  Surgeons and Role:     * Brooke Sanders MD - Resident - Assisting     * Judd Haque DPM - Resident - Assisting     * Joel Gant MD - Fellow    Procedure Summary  Anesthesia: General  ASA: III  Anesthesia Staff: Anesthesiologist: Mayi Marte MD; Momo James MD  CRNA: JAVIER Gifford  Anesthesia Break User: JAVIER Trinidad  Estimated Blood Loss: 50mL  Intra-op Medications:   Administrations occurring from 0715 to 1010 on 24:   Medication Name Total Dose   vancomycin (Vancocin) vial for injection 1 g   sodium chloride 0.9 % irrigation solution 4,000 mL   polyethylene glycol (Glycolax, Miralax) packet 17 g Cannot be calculated   sennosides-docusate sodium (Gwen-Colace) 8.6-50 mg per tablet 2 tablet Cannot be calculated              Anesthesia Record               Intraprocedure I/O Totals          Intake    Tranexamic Acid 0.00 mL    The total shown is the total volume documented since Anesthesia Start was filed.    Total Intake 0 mL          Specimen: No specimens collected     Staff:   Jaquelinulator: Willow Helmsub Person: Torrey  Scrub Person: Ksenia          Findings: left radial head and neck comminuted fracture, monteggia fracture    Complications:  None; patient tolerated the procedure well.     Disposition: PACU -  hemodynamically stable.  Condition: stable  Specimens Collected: No specimens collected  Attending Attestation: Dr. Foley was present and scrubbed for all critical portions of the procedure.
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
## 2024-06-04 NOTE — ANESTHESIA PROCEDURE NOTES
Airway  Date/Time: 6/4/2024 7:40 AM  Urgency: elective    Airway not difficult    Staffing  Performed: CRNA   Authorized by: Momo James MD    Performed by: AGUILAR Gifford-CHRIS  Patient location during procedure: OR    Indications and Patient Condition  Indications for airway management: anesthesia and airway protection  Spontaneous Ventilation: absent  Sedation level: deep  Preoxygenated: yes  Patient position: sniffing  MILS maintained throughout  Mask difficulty assessment: 2 - vent by mask + OA or adjuvant +/- NMBA  Planned trial extubation    Final Airway Details  Final airway type: endotracheal airway      Successful airway: ETT  Cuffed: yes   Successful intubation technique: video laryngoscopy  Facilitating devices/methods: intubating stylet  Endotracheal tube insertion site: oral  Blade: Timothy  Blade size: #4  ETT size (mm): 7.5  Placement verified by: chest auscultation and capnometry   Cuff volume (mL): 7  Measured from: lips  ETT to lips (cm): 21  Number of attempts at approach: 1
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
## 2024-06-04 NOTE — DISCHARGE INSTRUCTIONS
Weight bearing status: nonweightbering in splint    VTE Prophylaxis (Blood Clot Prevention): aspirin 81 mg twice a day      Home Medication: Resume all home medications    Resume normal diet     Leave operative dressing in place until follow up visit. Do not get the splint wet. TDo not soak in pool or tub. Do not swim in pools or ponds until 3 months after surgery.    Call if any drainage after 7 days, increased redness/warmth/swelling at incision site, pain/tenderness of calf, swelling of calf that does not respond to elevation, SOB/chest pain.    Call for any questions or concerns.     MEDICATION SIDE EFFECTS.  OXYCODONE: constipation, nausea, vomiting, upset stomach, (sleepiness), dizziness, lightheadedness, itching, headache, blurred vision, dry mouth, sweating  TRAMADOL: headache, dizziness, drowsiness, tired feeling; constipation, diarrhea, nausea, vomiting, stomach pain; feeling nervous or anxious, itching, sweating, flushing.  ASPIRIN:  upset stomach, heartburn; drowsiness; or headache    Follow up with Dr. Foley in 2 weeks. Call [phone] to schedule/confirm appointment.

## 2024-06-04 NOTE — H&P
H&P reviewed. The patient was examined and there are no changes to the H&P. Patient electing to proceed with surgery. Patient consented and posted.    Cruz Walden MD  Orthopaedic Surgery PGY-2

## 2024-06-04 NOTE — PROCEDURES
Peripheral Block    Patient location during procedure: post-op  Start time: 6/4/2024 12:30 PM  End time: 6/4/2024 12:50 PM  Reason for block: at surgeon's request and post-op pain management  Staffing  Performed: resident   Authorized by: Noemi Pitts MD    Performed by: Rimma Bee MD  Preanesthetic Checklist  Completed: patient identified, IV checked, site marked, risks and benefits discussed, surgical consent, monitors and equipment checked, pre-op evaluation and timeout performed   Timeout performed at: 6/4/2024 12:30 PM  Peripheral Block  Patient position: sitting  Prep: ChloraPrep  Patient monitoring: heart rate and continuous pulse ox  Block type: supraclavicular  Injection technique: single-shot  Guidance: ultrasound guided  Local infiltration: ropivacaine  Infiltration strength: 0.5 %  Dose: 25 mL  Needle  Needle type: Tuohy   Needle gauge: 22 G  Needle length: 5 cm  Needle localization: ultrasound guidance     image stored in chart  Assessment  Injection assessment: negative aspiration for heme, no paresthesia on injection, incremental injection and local visualized surrounding nerve on ultrasound  Paresthesia pain: none  Heart rate change: no  Slow fractionated injection: yes  Additional Notes  Supraclavicular brachial plexus block: Informed consent obtained.  Risk, benefits, alternatives discussed.  ASA monitors placed and timeout performed.  Patient positioned, prepped with chlorhexidine, and draped with sterile towels.  Ultrasound guidance used to visualize the brachial plexus and surrounding structures with visualization of the needle throughout duration of the procedure.  Aspiration was negative. 25 cc of 0.5% ropivacaine, 4mg dexamethasone  injected.  Patient tolerated procedure well.    Timeout by WILLIAN Godoy
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
## 2024-06-04 NOTE — CARE PLAN
Problem: Skin  Goal: Decreased wound size/increased tissue granulation at next dressing change  Outcome: Progressing  Goal: Participates in plan/prevention/treatment measures  Outcome: Progressing  Goal: Prevent/manage excess moisture  Outcome: Progressing  Goal: Prevent/minimize sheer/friction injuries  Outcome: Progressing  Goal: Promote/optimize nutrition  Outcome: Progressing  Goal: Promote skin healing  Outcome: Progressing     Problem: Pain  Goal: Takes deep breaths with improved pain control throughout the shift  Outcome: Progressing  Goal: Turns in bed with improved pain control throughout the shift  Outcome: Progressing  Goal: Walks with improved pain control throughout the shift  Outcome: Progressing  Goal: Performs ADL's with improved pain control throughout shift  Outcome: Progressing  Goal: Participates in PT with improved pain control throughout the shift  Outcome: Progressing  Goal: Free from opioid side effects throughout the shift  Outcome: Progressing  Goal: Free from acute confusion related to pain meds throughout the shift  Outcome: Progressing   The patient's goals for the shift include      The clinical goals for the shift include BP WNL and pain control

## 2024-06-04 NOTE — ANESTHESIA PROCEDURE NOTES
Peripheral IV  Date/Time: 6/4/2024 7:55 AM  Inserted by: AGUILAR Gifford-CRNA    Placement  Needle size: 18 G  Laterality: right  Location: wrist  Local anesthetic: none  Site prep: alcohol  Technique: anatomical landmarks  Attempts: 1

## 2024-06-05 LAB
ANION GAP SERPL CALC-SCNC: 13 MMOL/L (ref 10–20)
BUN SERPL-MCNC: 11 MG/DL (ref 6–23)
CALCIUM SERPL-MCNC: 8.9 MG/DL (ref 8.6–10.6)
CHLORIDE SERPL-SCNC: 103 MMOL/L (ref 98–107)
CO2 SERPL-SCNC: 25 MMOL/L (ref 21–32)
CREAT SERPL-MCNC: 1.01 MG/DL (ref 0.5–1.3)
EGFRCR SERPLBLD CKD-EPI 2021: >90 ML/MIN/1.73M*2
ERYTHROCYTE [DISTWIDTH] IN BLOOD BY AUTOMATED COUNT: 13.1 % (ref 11.5–14.5)
GLUCOSE SERPL-MCNC: 119 MG/DL (ref 74–99)
HCT VFR BLD AUTO: 42.9 % (ref 41–52)
HGB BLD-MCNC: 14.1 G/DL (ref 13.5–17.5)
MCH RBC QN AUTO: 27.1 PG (ref 26–34)
MCHC RBC AUTO-ENTMCNC: 32.9 G/DL (ref 32–36)
MCV RBC AUTO: 83 FL (ref 80–100)
NRBC BLD-RTO: 0 /100 WBCS (ref 0–0)
PLATELET # BLD AUTO: 346 X10*3/UL (ref 150–450)
POTASSIUM SERPL-SCNC: 3.6 MMOL/L (ref 3.5–5.3)
RBC # BLD AUTO: 5.2 X10*6/UL (ref 4.5–5.9)
SODIUM SERPL-SCNC: 137 MMOL/L (ref 136–145)
WBC # BLD AUTO: 12.3 X10*3/UL (ref 4.4–11.3)

## 2024-06-05 PROCEDURE — 99231 SBSQ HOSP IP/OBS SF/LOW 25: CPT

## 2024-06-05 PROCEDURE — 97161 PT EVAL LOW COMPLEX 20 MIN: CPT | Mod: GP

## 2024-06-05 PROCEDURE — 2500000004 HC RX 250 GENERAL PHARMACY W/ HCPCS (ALT 636 FOR OP/ED)

## 2024-06-05 PROCEDURE — 2500000001 HC RX 250 WO HCPCS SELF ADMINISTERED DRUGS (ALT 637 FOR MEDICARE OP)

## 2024-06-05 PROCEDURE — 85027 COMPLETE CBC AUTOMATED: CPT | Performed by: STUDENT IN AN ORGANIZED HEALTH CARE EDUCATION/TRAINING PROGRAM

## 2024-06-05 PROCEDURE — 36415 COLL VENOUS BLD VENIPUNCTURE: CPT | Performed by: STUDENT IN AN ORGANIZED HEALTH CARE EDUCATION/TRAINING PROGRAM

## 2024-06-05 PROCEDURE — 2500000004 HC RX 250 GENERAL PHARMACY W/ HCPCS (ALT 636 FOR OP/ED): Performed by: STUDENT IN AN ORGANIZED HEALTH CARE EDUCATION/TRAINING PROGRAM

## 2024-06-05 PROCEDURE — 2500000001 HC RX 250 WO HCPCS SELF ADMINISTERED DRUGS (ALT 637 FOR MEDICARE OP): Performed by: STUDENT IN AN ORGANIZED HEALTH CARE EDUCATION/TRAINING PROGRAM

## 2024-06-05 PROCEDURE — 80048 BASIC METABOLIC PNL TOTAL CA: CPT | Performed by: STUDENT IN AN ORGANIZED HEALTH CARE EDUCATION/TRAINING PROGRAM

## 2024-06-05 PROCEDURE — 1100000001 HC PRIVATE ROOM DAILY

## 2024-06-05 PROCEDURE — 97165 OT EVAL LOW COMPLEX 30 MIN: CPT | Mod: GO

## 2024-06-05 RX ORDER — HYDROMORPHONE HYDROCHLORIDE 1 MG/ML
0.2 INJECTION, SOLUTION INTRAMUSCULAR; INTRAVENOUS; SUBCUTANEOUS ONCE
Status: COMPLETED | OUTPATIENT
Start: 2024-06-05 | End: 2024-06-05

## 2024-06-05 RX ORDER — HYDRALAZINE HYDROCHLORIDE 20 MG/ML
10 INJECTION INTRAMUSCULAR; INTRAVENOUS ONCE
Status: DISCONTINUED | OUTPATIENT
Start: 2024-06-05 | End: 2024-06-05

## 2024-06-05 RX ORDER — HYDRALAZINE HYDROCHLORIDE 20 MG/ML
5 INJECTION INTRAMUSCULAR; INTRAVENOUS ONCE
Status: COMPLETED | OUTPATIENT
Start: 2024-06-05 | End: 2024-06-05

## 2024-06-05 RX ORDER — ACETAMINOPHEN 325 MG/1
975 TABLET ORAL EVERY 6 HOURS SCHEDULED
Status: DISCONTINUED | OUTPATIENT
Start: 2024-06-05 | End: 2024-06-06 | Stop reason: HOSPADM

## 2024-06-05 RX ORDER — GABAPENTIN 100 MG/1
100 CAPSULE ORAL 3 TIMES DAILY
Status: DISCONTINUED | OUTPATIENT
Start: 2024-06-05 | End: 2024-06-06 | Stop reason: HOSPADM

## 2024-06-05 RX ORDER — METHOCARBAMOL 500 MG/1
1000 TABLET, FILM COATED ORAL EVERY 8 HOURS SCHEDULED
Status: DISCONTINUED | OUTPATIENT
Start: 2024-06-05 | End: 2024-06-06 | Stop reason: HOSPADM

## 2024-06-05 RX ORDER — HYDROMORPHONE HYDROCHLORIDE 1 MG/ML
0.4 INJECTION, SOLUTION INTRAMUSCULAR; INTRAVENOUS; SUBCUTANEOUS ONCE
Status: COMPLETED | OUTPATIENT
Start: 2024-06-05 | End: 2024-06-05

## 2024-06-05 RX ORDER — KETOROLAC TROMETHAMINE 30 MG/ML
30 INJECTION, SOLUTION INTRAMUSCULAR; INTRAVENOUS EVERY 8 HOURS PRN
Status: DISCONTINUED | OUTPATIENT
Start: 2024-06-05 | End: 2024-06-06 | Stop reason: HOSPADM

## 2024-06-05 RX ORDER — HYDROMORPHONE HYDROCHLORIDE 1 MG/ML
0.4 INJECTION, SOLUTION INTRAMUSCULAR; INTRAVENOUS; SUBCUTANEOUS
Status: DISCONTINUED | OUTPATIENT
Start: 2024-06-05 | End: 2024-06-06 | Stop reason: HOSPADM

## 2024-06-05 RX ADMIN — HYDRALAZINE HYDROCHLORIDE 5 MG: 20 INJECTION INTRAMUSCULAR; INTRAVENOUS at 14:15

## 2024-06-05 RX ADMIN — POLYETHYLENE GLYCOL 3350 17 G: 17 POWDER, FOR SOLUTION ORAL at 08:01

## 2024-06-05 RX ADMIN — HYDROMORPHONE HYDROCHLORIDE 0.4 MG: 1 INJECTION, SOLUTION INTRAMUSCULAR; INTRAVENOUS; SUBCUTANEOUS at 08:01

## 2024-06-05 RX ADMIN — ACETAMINOPHEN 975 MG: 325 TABLET ORAL at 17:32

## 2024-06-05 RX ADMIN — HYDRALAZINE HYDROCHLORIDE 5 MG: 20 INJECTION INTRAMUSCULAR; INTRAVENOUS at 08:01

## 2024-06-05 RX ADMIN — ASPIRIN 81 MG: 81 TABLET, COATED ORAL at 21:50

## 2024-06-05 RX ADMIN — OXYCODONE HYDROCHLORIDE 10 MG: 5 TABLET ORAL at 16:05

## 2024-06-05 RX ADMIN — SENNOSIDES AND DOCUSATE SODIUM 2 TABLET: 50; 8.6 TABLET ORAL at 08:01

## 2024-06-05 RX ADMIN — KETOROLAC TROMETHAMINE 30 MG: 30 INJECTION, SOLUTION INTRAMUSCULAR at 17:32

## 2024-06-05 RX ADMIN — ASPIRIN 81 MG: 81 TABLET, COATED ORAL at 08:01

## 2024-06-05 RX ADMIN — OXYCODONE HYDROCHLORIDE 10 MG: 5 TABLET ORAL at 06:46

## 2024-06-05 RX ADMIN — HYDROMORPHONE HYDROCHLORIDE 0.4 MG: 1 INJECTION, SOLUTION INTRAMUSCULAR; INTRAVENOUS; SUBCUTANEOUS at 11:50

## 2024-06-05 RX ADMIN — ACETAMINOPHEN 650 MG: 325 TABLET ORAL at 11:24

## 2024-06-05 RX ADMIN — HYDRALAZINE HYDROCHLORIDE 5 MG: 20 INJECTION INTRAMUSCULAR; INTRAVENOUS at 09:26

## 2024-06-05 RX ADMIN — HYDROMORPHONE HYDROCHLORIDE 0.2 MG: 1 INJECTION, SOLUTION INTRAMUSCULAR; INTRAVENOUS; SUBCUTANEOUS at 14:15

## 2024-06-05 RX ADMIN — HYDRALAZINE HYDROCHLORIDE 5 MG: 20 INJECTION INTRAMUSCULAR; INTRAVENOUS at 21:51

## 2024-06-05 RX ADMIN — GABAPENTIN 100 MG: 100 CAPSULE ORAL at 17:32

## 2024-06-05 RX ADMIN — ACETAMINOPHEN 650 MG: 325 TABLET ORAL at 06:46

## 2024-06-05 RX ADMIN — GABAPENTIN 100 MG: 100 CAPSULE ORAL at 21:50

## 2024-06-05 RX ADMIN — SENNOSIDES AND DOCUSATE SODIUM 2 TABLET: 50; 8.6 TABLET ORAL at 21:50

## 2024-06-05 RX ADMIN — OXYCODONE HYDROCHLORIDE 10 MG: 5 TABLET ORAL at 10:48

## 2024-06-05 RX ADMIN — CEFAZOLIN 3 G: 10 INJECTION, POWDER, FOR SOLUTION INTRAVENOUS at 05:55

## 2024-06-05 RX ADMIN — METHOCARBAMOL 1000 MG: 500 TABLET ORAL at 17:32

## 2024-06-05 ASSESSMENT — PAIN SCALES - GENERAL
PAINLEVEL_OUTOF10: 8
PAINLEVEL_OUTOF10: 10 - WORST POSSIBLE PAIN
PAINLEVEL_OUTOF10: 10 - WORST POSSIBLE PAIN
PAINLEVEL_OUTOF10: 8
PAINLEVEL_OUTOF10: 7
PAINLEVEL_OUTOF10: 10 - WORST POSSIBLE PAIN
PAINLEVEL_OUTOF10: 7
PAINLEVEL_OUTOF10: 10 - WORST POSSIBLE PAIN
PAINLEVEL_OUTOF10: 10 - WORST POSSIBLE PAIN
PAINLEVEL_OUTOF10: 8

## 2024-06-05 ASSESSMENT — COGNITIVE AND FUNCTIONAL STATUS - GENERAL
MOBILITY SCORE: 24
HELP NEEDED FOR BATHING: A LITTLE
DAILY ACTIVITIY SCORE: 23

## 2024-06-05 ASSESSMENT — PAIN DESCRIPTION - ORIENTATION: ORIENTATION: LEFT

## 2024-06-05 ASSESSMENT — PAIN - FUNCTIONAL ASSESSMENT
PAIN_FUNCTIONAL_ASSESSMENT: 0-10

## 2024-06-05 ASSESSMENT — ACTIVITIES OF DAILY LIVING (ADL)
ADL_ASSISTANCE: INDEPENDENT
ADL_ASSISTANCE: INDEPENDENT
BATHING_ASSISTANCE: MINIMAL
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
## 2024-06-05 NOTE — PROGRESS NOTES
Occupational Therapy                                           Therapy Communication Note    Patient Name: Herberth Brandt  MRN: [id number]  Today's Date: 6/5/2024     Discipline: Occupational Therapy    Missed Visit Reason: Missed Visit Reason: Patient placed on medical hold (RN reporting pt has elevated BP (184/130) and is not appropriate for OT eval at this time @ 1008. Reattempted at 1130 and BP remains elevated. OT hold at this time. Will reattempt as medically appropriate and as schedule permits.)    Missed Time: Attempt
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
## 2024-06-05 NOTE — NURSING NOTE
Pt BP @ 0710 was 184/101. Pt was given 0.4 Dilaudid and 5mg Hydralazine @0801.  Pt BP and pain was reassessed @0853 and was 174/134 with 10/10 pain.  Provider Nicolas JOSE MD was notified on at 0900 about patient's BP and uncontrolled pain.  Provider ordered second dose of 5mg hydralazine without additional pain medication. Pt continued to have 10/10 pain @ 1048 and was given 10mg of oxy per order.  Pt continued to have 10/10 pain Provider Nicolas was no longer available due to procedure attendance.   Acute Pain Team was notified at this time to assess pt pain, no medications were ordered from this team.   Ortho was paged, provider William PARISH MD gave an one time order of 0.4mg of Dilaudid.   At 1127, pt has /143 with no follow up orders at this time. Pt's follow-up pain 7/10.    Nurse is continuing to wait for follow-up orders from Provider Nicolas JOSE MD and/or ortho team.

## 2024-06-05 NOTE — CARE PLAN
Problem: Skin  Goal: Decreased wound size/increased tissue granulation at next dressing change  Outcome: Progressing  Goal: Participates in plan/prevention/treatment measures  Outcome: Progressing  Goal: Prevent/manage excess moisture  Outcome: Progressing  Goal: Prevent/minimize sheer/friction injuries  Outcome: Progressing  Goal: Promote/optimize nutrition  Outcome: Progressing  Goal: Promote skin healing  Outcome: Progressing     Problem: Pain  Goal: Takes deep breaths with improved pain control throughout the shift  Outcome: Progressing  Goal: Turns in bed with improved pain control throughout the shift  Outcome: Progressing  Goal: Walks with improved pain control throughout the shift  Outcome: Progressing  Goal: Performs ADL's with improved pain control throughout shift  Outcome: Progressing  Goal: Participates in PT with improved pain control throughout the shift  Outcome: Progressing  Goal: Free from opioid side effects throughout the shift  Outcome: Progressing  Goal: Free from acute confusion related to pain meds throughout the shift  Outcome: Progressing   The patient's goals for the shift include      The clinical goals for the shift include Pt will be pain controlled throughout shift.

## 2024-06-05 NOTE — PROGRESS NOTES
Herberth Brandt is a 33 y.o. year old male patient who presents for L radial and ulnar ORIF with Dr. Foley on 6/4. Acute Pain consulted for block for postoperative pain control. Post op supraclavicular block performed 6.4.     Postop Pain HPI -   Palliative: relieved with IV analgesics and regional local anesthetics  Provocative: movement  Quality:  burning and aching  Radiation:  none  Severity: 10 /10  Timing: constant    24-HOUR OPIOID CONSUMPTION:  20 mg oxycodone   1 mg Dilaudid     Scheduled medications  acetaminophen, 650 mg, oral, q6h NBA  aspirin, 81 mg, oral, BID  ceFAZolin, 3 g, intravenous, q8h  ketamine, 100 mg, intravenous, Once  polyethylene glycol, 17 g, oral, Daily  sennosides-docusate sodium, 2 tablet, oral, BID      Continuous medications  lactated Ringer's, 50 mL/hr, Last Rate: 50 mL/hr (06/04/24 1744)  oxygen, , Last Rate: 4 L/min (05/31/24 2310)  oxygen, 2 L/min      PRN medications  PRN medications: bisacodyl, diphenhydrAMINE, hydrALAZINE, HYDROmorphone, magnesium hydroxide, naloxone, naloxone, naloxone, ondansetron **OR** ondansetron, oxyCODONE, oxyCODONE     Physical Exam:  Constitutional:  no distress, alert and cooperative  Eyes: clear sclera  Head/Neck: No apparent injury, trachea midline  Respiratory/Thorax: Patent airways, thorax symmetric, breathing comfortably  Cardiovascular: no pitting edema  Gastrointestinal: Nondistended  Musculoskeletal: ROM intact  Extremities: no clubbing  Neurological: alert, clayton x4  Psychological: Appropriate affect    Results for orders placed or performed during the hospital encounter of 05/31/24 (from the past 24 hour(s))   Electrocardiogram, 12-lead PRN ACS symptoms   Result Value Ref Range    Ventricular Rate 118 BPM    Atrial Rate 118 BPM    OR Interval 150 ms    QRS Duration 72 ms    QT Interval 350 ms    QTC Calculation(Bazett) 490 ms    P Axis 67 degrees    R Axis 57 degrees    T Axis -5 degrees    QRS Count 19 beats    Q Onset 222 ms    P Onset 147 ms     P Offset 201 ms    T Offset 397 ms    QTC Fredericia 438 ms   Electrocardiogram, 12-lead PRN ACS symptoms   Result Value Ref Range    Ventricular Rate 114 BPM    Atrial Rate 114 BPM    CO Interval 152 ms    QRS Duration 74 ms    QT Interval 346 ms    QTC Calculation(Bazett) 476 ms    P Axis 67 degrees    R Axis 54 degrees    T Axis -9 degrees    QRS Count 19 beats    Q Onset 221 ms    P Onset 145 ms    P Offset 200 ms    T Offset 394 ms    QTC Fredericia 428 ms        Herberth Brandt is a 33 y.o. year old male patient who presents for L radial and ulnar ORIF with Dr. Foley on 6/4. Acute Pain consulted for block for postoperative pain control. Post op supraclavicular block performed 6.4.     Plan:     - L Supraclavicular blocks performed post-operatively on 6/4  -  Recommend starting scheduled Tylenol 975 mg Q6hrs  , schedule Robaxin 1 g Q8hrs , PRN Toradol 30 mg IV q8hrs   - consider starting Gabapentin 100 mg TID   - Rest of pain management per primary team  - Acute pain service will sign  off      Acute Pain Resident  pg [phone] ph [phone]

## 2024-06-06 ENCOUNTER — PHARMACY VISIT (OUTPATIENT)
Dept: PHARMACY | Facility: CLINIC | Age: 34
End: 2024-06-06
Payer: COMMERCIAL

## 2024-06-06 VITALS
RESPIRATION RATE: 18 BRPM | OXYGEN SATURATION: 97 % | HEIGHT: 71 IN | TEMPERATURE: 97.3 F | DIASTOLIC BLOOD PRESSURE: 122 MMHG | SYSTOLIC BLOOD PRESSURE: 160 MMHG | BODY MASS INDEX: 42 KG/M2 | HEART RATE: 109 BPM | WEIGHT: 300 LBS

## 2024-06-06 PROCEDURE — 2500000004 HC RX 250 GENERAL PHARMACY W/ HCPCS (ALT 636 FOR OP/ED)

## 2024-06-06 PROCEDURE — 24343 REPR ELBOW LAT LIGMNT W/TISS: CPT | Performed by: ORTHOPAEDIC SURGERY

## 2024-06-06 PROCEDURE — 2500000001 HC RX 250 WO HCPCS SELF ADMINISTERED DRUGS (ALT 637 FOR MEDICARE OP): Performed by: STUDENT IN AN ORGANIZED HEALTH CARE EDUCATION/TRAINING PROGRAM

## 2024-06-06 PROCEDURE — 24587 OPTX PARTCLR FX&/DSLC ELBW W: CPT | Performed by: ORTHOPAEDIC SURGERY

## 2024-06-06 PROCEDURE — RXMED WILLOW AMBULATORY MEDICATION CHARGE

## 2024-06-06 PROCEDURE — 2500000001 HC RX 250 WO HCPCS SELF ADMINISTERED DRUGS (ALT 637 FOR MEDICARE OP)

## 2024-06-06 RX ORDER — GABAPENTIN 100 MG/1
100 CAPSULE ORAL 3 TIMES DAILY
Qty: 90 CAPSULE | Refills: 0 | Status: SHIPPED | OUTPATIENT
Start: 2024-06-06

## 2024-06-06 RX ORDER — METHOCARBAMOL 500 MG/1
500 TABLET, FILM COATED ORAL 4 TIMES DAILY
Qty: 90 TABLET | Refills: 2 | Status: SHIPPED | OUTPATIENT
Start: 2024-06-06

## 2024-06-06 RX ORDER — HYDRALAZINE HYDROCHLORIDE 20 MG/ML
10 INJECTION INTRAMUSCULAR; INTRAVENOUS ONCE
Status: COMPLETED | OUTPATIENT
Start: 2024-06-06 | End: 2024-06-06

## 2024-06-06 RX ORDER — HYDRALAZINE HYDROCHLORIDE 20 MG/ML
10 INJECTION INTRAMUSCULAR; INTRAVENOUS EVERY 6 HOURS PRN
Status: DISCONTINUED | OUTPATIENT
Start: 2024-06-06 | End: 2024-06-06 | Stop reason: HOSPADM

## 2024-06-06 RX ADMIN — OXYCODONE HYDROCHLORIDE 5 MG: 5 TABLET ORAL at 08:53

## 2024-06-06 RX ADMIN — GABAPENTIN 100 MG: 100 CAPSULE ORAL at 08:48

## 2024-06-06 RX ADMIN — HYDRALAZINE HYDROCHLORIDE 10 MG: 20 INJECTION INTRAMUSCULAR; INTRAVENOUS at 11:35

## 2024-06-06 RX ADMIN — ASPIRIN 81 MG: 81 TABLET, COATED ORAL at 08:48

## 2024-06-06 RX ADMIN — POLYETHYLENE GLYCOL 3350 17 G: 17 POWDER, FOR SOLUTION ORAL at 08:48

## 2024-06-06 RX ADMIN — ACETAMINOPHEN 975 MG: 325 TABLET ORAL at 11:34

## 2024-06-06 RX ADMIN — SENNOSIDES AND DOCUSATE SODIUM 2 TABLET: 50; 8.6 TABLET ORAL at 08:48

## 2024-06-06 RX ADMIN — METHOCARBAMOL 1000 MG: 500 TABLET ORAL at 01:10

## 2024-06-06 RX ADMIN — HYDRALAZINE HYDROCHLORIDE 5 MG: 20 INJECTION INTRAMUSCULAR; INTRAVENOUS at 05:09

## 2024-06-06 RX ADMIN — METHOCARBAMOL 1000 MG: 500 TABLET ORAL at 11:34

## 2024-06-06 RX ADMIN — ACETAMINOPHEN 975 MG: 325 TABLET ORAL at 01:10

## 2024-06-06 ASSESSMENT — COGNITIVE AND FUNCTIONAL STATUS - GENERAL
HELP NEEDED FOR BATHING: A LITTLE
DAILY ACTIVITIY SCORE: 23
MOBILITY SCORE: 24

## 2024-06-06 ASSESSMENT — PAIN SCALES - GENERAL
PAINLEVEL_OUTOF10: 0 - NO PAIN
PAINLEVEL_OUTOF10: 5 - MODERATE PAIN

## 2024-06-06 NOTE — CARE PLAN
The patient's goals for the shift include  pain control     The clinical goals for the shift include SBP less than 160

## 2024-06-06 NOTE — PROGRESS NOTES
"Herberth Brandt is a 33 y.o. male on day 4 of admission presenting with Closed Monteggia's fracture of left arm.    Orthopaedic Surgery Progress Note    S:  NAEO. Reports pain controlled. Denies any new headache, chest pain, SOB, nausea.     O:  BP (!) 173/109   Pulse (!) 112   Temp 35.2 °C (95.4 °F) (Temporal)   Resp 17   Ht 1.803 m (5' 11\")   Wt 136 kg (300 lb)   SpO2 97%   BMI 41.84 kg/m²     Constitutional: NAD  HEENT: hearing and vision grossly intact, MMM  Resp: breathing comfortably   CV: extremities warm, well perfused  Neuro: alert, sensory and motor grossly intact  Psych: Appropriate mood and affect      MSK:  LUE  - Splint in place, c/d/i  -Tender about L elbow  -Motor intact in axillary. Weak AIN/PIN/Ulnar but intact  -SILT axillary/radial/median/ulnar distributions  -Hand warm, well perfused  -Brisk cap refill  -Compartments soft and compressible      A/P: 33 y.o. male w a L monteggia fx w comminuted radial head s/p ORIF L ulna, radial head arthroplasty on 6/4/2024 with Dr. Foley.  Doing well.     Plan:  - Weight bearing: NWB LUE in splint  - Appreciate Acute Pain recs  - DVT ppx: SCDs, ASA 81 BID  - Diet: Regular   - Pain: Tylenol, oxycodone 5/10  - Antibiotics: perioperative ancef 2g q8hr x3 doses  - FEN: HLIV with good PO intake  - Bowel Regimen: Colace, senna, dulcolax  - PT/OT  - Pulm: Encourage IS  - Continue home medications      Dispo: DC home today    Nicolas Soliz MD  PGY-2 Orthopedic Surgery  [facility]  [employer] Preferred  Pager: [phone]    While admitted, this patient will be followed by the Ortho Trauma Team. Please contact below residents with any questions (available via Epic Chat).     First call: Oscar Duran, PGY-1  Second call: Nicolas Soliz, PGY-2   Third call: Brooke Sanders, PGY-3    6pm-6am M-F, Holidays, and weekends page Ortho on-call @[phone] with urgent questions/concerns.      "

## 2024-06-06 NOTE — NURSING NOTE
RN notified Heydi from [facility] about the pt's recents bp. Hydralazine was given to the at 0508 for a BP of 173/109. At 0808 his repeat bp is 180/131. Pt rated his pain at 5/10. Pain medications were given. Pt remained asymptomatic at this time. Hydralazine is too soon to be administered again and there are no other current interventions at this time. At 1028 The pt's BP is rechecked 160/122.  At 1135, Hydralazine administered upon discharge. Upon discharge, the pt is educated on the importance of making an appt to his PCP regarding his hypertensive needs.

## 2024-06-06 NOTE — HOSPITAL COURSE
33 year-old male who presented with left elbow fracture dislocation. Patient is now s/p Left Elbow ORIF on 6/4 by Dr. Foley. On the day of surgery, patient was identified in the pre-operative holding area and agreeable to proceed with surgery. Written consent was obtained.  Please see operative note for further details of this procedure. Patient received 24 hours of emeli-operative antibiotics. Patient recovered in the PACU before transfer to a regular nursing floor. Patient was started on oxycodone, tylenol, and dilaudid for pain control and ASA 81 mg bid for DVT prophylaxis. Physical therapy recommended continued recovery at home. While inpatient, patient did have some elevated blood pressures which were treated with hydralazine as needed. He was recommended to follow-up with a PCP immediately after discharge for ongoing management of his hypertension. On the day of discharge, patient was afebrile with stable vital signs. Patient was neurovascularly intact at time of discharge. Patient was discharged with prescription of ASA 81 mg bid for DVT prophylaxis for 4 weeks. Patient will follow-up with Dr. Foley in 2 weeks for post-operative visit.

## 2024-06-06 NOTE — DISCHARGE SUMMARY
Discharge Diagnosis  Closed Monteggia's fracture of left arm    Issues Requiring Follow-Up  Left monteggia fracture    Test Results Pending At Discharge  Pending Labs       No current pending labs.            Hospital Course  33 year-old male who presented with left elbow fracture dislocation. Patient is now s/p Left Elbow ORIF on 6/4 by Dr. Foley. On the day of surgery, patient was identified in the pre-operative holding area and agreeable to proceed with surgery. Written consent was obtained.  Please see operative note for further details of this procedure. Patient received 24 hours of emeli-operative antibiotics. Patient recovered in the PACU before transfer to a regular nursing floor. Patient was started on oxycodone, tylenol, and dilaudid for pain control and ASA 81 mg bid for DVT prophylaxis. Physical therapy recommended continued recovery at home. While inpatient, patient did have some elevated blood pressures which were treated with hydralazine as needed. He was recommended to follow-up with a PCP immediately after discharge for ongoing management of his hypertension. On the day of discharge, patient was afebrile with stable vital signs. Patient was neurovascularly intact at time of discharge. Patient was discharged with prescription of ASA 81 mg bid for DVT prophylaxis for 4 weeks. Patient will follow-up with Dr. Foley in 2 weeks for post-operative visit.      Pertinent Physical Exam At Time of Discharge  Constitutional: NAD  HEENT: hearing and vision grossly intact, MMM  Resp: breathing comfortably   CV: extremities warm, well perfused  Neuro: alert, sensory and motor grossly intact  Psych: Appropriate mood and affect    LUE  - Splint in place  -Tender at site of injury  -Motor intact in axillary/AIN/PIN/ulnar distributions  -SILT axillary/radial/median/ulnar distributions  -Hand warm, well perfused  -Palpable radial pulse, cap refill brisk  -Compartments soft and compressible      Home Medications      Medication List      START taking these medications     aspirin 81 mg EC tablet; Take 1 tablet (81 mg) by mouth 2 times a day.   docusate sodium 100 mg capsule; Commonly known as: Colace; Take 1   capsule (100 mg) by mouth 2 times a day.   gabapentin 100 mg capsule; Commonly known as: Neurontin; Take 1 capsule   (100 mg) by mouth 3 times a day.   methocarbamol 500 mg tablet; Commonly known as: Robaxin; Take 1 tablet   (500 mg) by mouth 4 times a day.   oxyCODONE 5 mg immediate release tablet; Commonly known as: Roxicodone;   Take 1 tablet (5 mg) by mouth every 6 hours if needed for severe pain (7 -   10) for up to 7 days.   Oysco 500/D 500 mg-5 mcg (200 unit) tablet; Generic drug: calcium   carbonate-vitamin D3; Take 1 tablet by mouth once daily.       Outpatient Follow-Up  Future Appointments   Date Time Provider Department Center   6/19/2024 11:45 AM Joelle Mcclain PA-C [id number] [facility]       Brooke Sanders MD

## 2024-06-19 ENCOUNTER — OFFICE VISIT (OUTPATIENT)
Dept: ORTHOPEDIC SURGERY | Facility: CLINIC | Age: 34
End: 2024-06-19

## 2024-06-19 ENCOUNTER — HOSPITAL ENCOUNTER (OUTPATIENT)
Dept: RADIOLOGY | Facility: CLINIC | Age: 34
Discharge: HOME | End: 2024-06-19

## 2024-06-19 VITALS — WEIGHT: 300 LBS | HEIGHT: 71 IN | BODY MASS INDEX: 42 KG/M2

## 2024-06-19 DIAGNOSIS — S42.402A CLOSED FRACTURE OF LEFT ELBOW, INITIAL ENCOUNTER: Primary | ICD-10-CM

## 2024-06-19 DIAGNOSIS — S42.402A CLOSED FRACTURE OF LEFT ELBOW, INITIAL ENCOUNTER: ICD-10-CM

## 2024-06-19 PROCEDURE — 99024 POSTOP FOLLOW-UP VISIT: CPT | Performed by: PHYSICIAN ASSISTANT

## 2024-06-19 PROCEDURE — 73070 X-RAY EXAM OF ELBOW: CPT | Mod: LT

## 2024-06-19 PROCEDURE — 1036F TOBACCO NON-USER: CPT | Performed by: PHYSICIAN ASSISTANT

## 2024-06-19 RX ORDER — METHOCARBAMOL 500 MG/1
500 TABLET, FILM COATED ORAL 4 TIMES DAILY
Qty: 90 TABLET | Refills: 2 | Status: SHIPPED | OUTPATIENT
Start: 2024-06-19

## 2024-06-19 RX ORDER — OXYCODONE HYDROCHLORIDE 5 MG/1
5 TABLET ORAL EVERY 6 HOURS PRN
Qty: 28 TABLET | Refills: 0 | Status: SHIPPED | OUTPATIENT
Start: 2024-06-19 | End: 2024-06-26

## 2024-06-19 RX ORDER — FERROUS SULFATE, DRIED 160(50) MG
1 TABLET, EXTENDED RELEASE ORAL DAILY
Qty: 30 TABLET | Refills: 0 | Status: SHIPPED | OUTPATIENT
Start: 2024-06-19 | End: 2024-07-19

## 2024-06-19 ASSESSMENT — PAIN - FUNCTIONAL ASSESSMENT: PAIN_FUNCTIONAL_ASSESSMENT: NO/DENIES PAIN

## 2024-06-19 NOTE — PROGRESS NOTES
History of Present Illness   Herberth Brandt is a 33 y.o. male presenting today for post-op check from ORIF L ulna with radial head arthroplasty on 6/4/24. Overall doing ok. Has mild pain that is requiring narcotics for pain control. Rates pain a 5/10. Incisions are well healing without redness or drainage. Compliant with WB recommendations. Denies numbness, tingling, f/c, CP, SOB or any other complaints or concerns.      No past medical history on file.    Medication Documentation Review Audit       Reviewed by Peter Yoon RN (Registered Nurse) on 06/04/24 at 0647      Medication Order Taking? Sig Documenting Provider Last Dose Status            No Medications to Display                                   No Known Allergies    Social History     Socioeconomic History    Marital status: Single     Spouse name: Not on file    Number of children: Not on file    Years of education: Not on file    Highest education level: Not on file   Occupational History    Not on file   Tobacco Use    Smoking status: Never     Passive exposure: Never    Smokeless tobacco: Never   Vaping Use    Vaping status: Never Used   Substance and Sexual Activity    Alcohol use: Not Currently    Drug use: Yes     Types: Marijuana    Sexual activity: Not on file   Other Topics Concern    Not on file   Social History Narrative    Not on file     Social Determinants of Health     Financial Resource Strain: Low Risk  (6/1/2024)    Overall Financial Resource Strain (CARDIA)     Difficulty of Paying Living Expenses: Not hard at all   Food Insecurity: Not on File (11/18/2019)    Received from [facility]     Food Insecurity     Food: 0   Transportation Needs: No Transportation Needs (6/1/2024)    PRAPARE - Transportation     Lack of Transportation (Medical): No     Lack of Transportation (Non-Medical): No   Physical Activity: Not on File (11/18/2019)    Received from [facility]     Physical Activity     Physical Activity: 0   Stress: Not on File (11/18/2019)     Received from [facility]     Stress     Stress: 0   Social Connections: Not on File (11/18/2019)    Received from [facility]     Social Connections and Isolation: 0   Intimate Partner Violence: Not on file   Housing Stability: Low Risk  (6/1/2024)    Housing Stability Vital Sign     Unable to Pay for Housing in the Last Year: No     Number of Places Lived in the Last Year: 1     Unstable Housing in the Last Year: No       No past surgical history on file.       Review of Systems:  30 point ROS reviewed and negative other than as listed in the HPI     Physical Exam:  Gen: The pt is A&Ox3, NAD, and appear state age and weight  Psychiatric: mood and affect are appropriate   Eyes: sclera are white, EOM grossly intact  ENT: MMM  Neck: supple, thyroid is midline  Respiratory: respirations are nonlabored, chest rise symmetric  CV: rate is regular by palpation of distal pulses  Abdomen: nondistended   Integument: no obvious cutaneous lesions noted. No signs of lymphangitis. No signs of systemic edema.   MSK:  L elbow  surgical incision well healing without edema, erythema, drainage, or other s/sx of infection. Appropriately tender to palpation around the incision. SILT intact throughout axillary, radial, median, and ulnar nerve distributions. Able to extend thumb and wrist. Hand warm and well perfused.     Imaging:  I personally reviewed multiple views of the  L elbow  were obtained in the office today demonstrate maintenance of reduction, interval healing, and a stable position of the hardware.      Assessment   33 y.o. male post-op from ORIF L ulna with radial head arthroplasty on 6/4/24.     Plan:  Continue NWB on  LUE; can start uncoupled ROM of the elbow. With the hand pronated he can extend to 30 degrees and flex as tolerated; with the elbow at 90 degrees he can supinate/pronate as tolerated.   Incisions well healing; staples not ready to be removed.     Continue DVT ppx and vit d/calcium for bone  health  Pt requesting refill of narcotics. OARSS was reviewed and not concerning for signs of abuse thus in setting of acute post-operative period patient provided a refill after counseling on risk of addiction.     Patient was prescribed a t-scope elbow brace for  L elbow  injury. This mobility device is required for the following reasons:     1. The patient has a mobility limitation that significantly impairs their ability to participate in one or more mobility-related activities of daily living (MRADL) in the home; and  2. The patient is able to safely use the mobility device; and  3. The functional mobility deficit can be sufficiently resolved with use of the mobility device     Verbal and written instructions for the use, wear schedule, cleaning and application of this item were given. Education provided also included gait training and safety precautions when using this device. Patient was instructed that should the item result in increased pain, decreased sensation, increased swelling, or an overall worsening of their medical condition, to please contact our office immediately.      Follow-up 1 month with 2 views left elbow.     All of the patient's questions/concerns address and they are in agreement with the plan.

## 2024-06-26 ENCOUNTER — OFFICE VISIT (OUTPATIENT)
Dept: ORTHOPEDIC SURGERY | Facility: CLINIC | Age: 34
End: 2024-06-26

## 2024-06-26 VITALS — HEIGHT: 71 IN | BODY MASS INDEX: 42 KG/M2 | WEIGHT: 300 LBS

## 2024-06-26 DIAGNOSIS — S52.272D CLOSED MONTEGGIA'S FRACTURE OF LEFT ULNA WITH ROUTINE HEALING, SUBSEQUENT ENCOUNTER: Primary | ICD-10-CM

## 2024-06-26 PROCEDURE — 1036F TOBACCO NON-USER: CPT | Performed by: PHYSICIAN ASSISTANT

## 2024-06-26 PROCEDURE — 99024 POSTOP FOLLOW-UP VISIT: CPT | Performed by: PHYSICIAN ASSISTANT

## 2024-06-26 ASSESSMENT — PAIN SCALES - GENERAL: PAINLEVEL_OUTOF10: 6

## 2024-06-26 ASSESSMENT — PAIN - FUNCTIONAL ASSESSMENT: PAIN_FUNCTIONAL_ASSESSMENT: 0-10

## 2024-06-26 ASSESSMENT — PAIN DESCRIPTION - DESCRIPTORS: DESCRIPTORS: ACHING;SORE

## 2024-06-28 NOTE — PROGRESS NOTES
History of Present Illness   Herberth Brandt is a 33 y.o. male presenting today for planned wound check from ORIF L ulna with radial head arthroplasty on 6/4/24.  Seems to be doing well today.  Has not started therapy and has not been doing any range of motion exercises himself at home due to being nervous about moving his arm.  Incision continues to heal well without redness and drainage.  Is still having some swelling in his hand but this seems to be improving with elevation.  No numbness or tingling or any other complaints or concerns     No past medical history on file.    Medication Documentation Review Audit       Reviewed by De Díaz LPN (Licensed Nurse) on 06/26/24 at 1205      Medication Order Taking? Sig Documenting Provider Last Dose Status   aspirin 81 mg EC tablet [id number]  Take 1 tablet (81 mg) by mouth 2 times a day. Brooke Sanders MD  Active   calcium carbonate-vitamin D3 500 mg-5 mcg (200 unit) tablet [id number]  Take 1 tablet by mouth once daily. Joelle Mcclain PA-C  Active   docusate sodium (Colace) 100 mg capsule [id number]  Take 1 capsule (100 mg) by mouth 2 times a day. Brooke Sanders MD  Active   gabapentin (Neurontin) 100 mg capsule [id number]  Take 1 capsule (100 mg) by mouth 3 times a day. Nicolas Soliz MD  Active   methocarbamol (Robaxin) 500 mg tablet [id number]  Take 1 tablet (500 mg) by mouth 4 times a day. Joelle Mcclain PA-C  Active   oxyCODONE (Roxicodone) 5 mg immediate release tablet [id number]  Take 1 tablet (5 mg) by mouth every 6 hours if needed for severe pain (7 - 10) for up to 7 days. Joelle Mcclain PA-C  Active                    No Known Allergies    Social History     Socioeconomic History    Marital status: Single     Spouse name: Not on file    Number of children: Not on file    Years of education: Not on file    Highest education level: Not on file   Occupational History    Not on file   Tobacco Use    Smoking status: Never     Passive exposure: Never     Smokeless tobacco: Never   Vaping Use    Vaping status: Never Used   Substance and Sexual Activity    Alcohol use: Not Currently    Drug use: Yes     Types: Marijuana    Sexual activity: Not on file   Other Topics Concern    Not on file   Social History Narrative    Not on file     Social Determinants of Health     Financial Resource Strain: Low Risk  (6/21/2024)    Received from [facility]    Overall Financial Resource Strain (CARDIA)     Difficulty of Paying Living Expenses: Not hard at all   Food Insecurity: Unknown (6/21/2024)    Received from [facility]    Hunger Vital Sign     Worried About Running Out of Food in the Last Year: Never true     Ran Out of Food in the Last Year: Not on file   Transportation Needs: No Transportation Needs (6/21/2024)    Received from [facility]    PRAPARE - Transportation     Lack of Transportation (Medical): No     Lack of Transportation (Non-Medical): No   Physical Activity: Inactive (6/21/2024)    Received from [facility]    Exercise Vital Sign     Days of Exercise per Week: 0 days     Minutes of Exercise per Session: 0 min   Stress: No Stress Concern Present (6/21/2024)    Received from [facility]    [employer] - Occupational Stress Questionnaire     Feeling of Stress : Not at all   Social Connections: Moderately Isolated (6/21/2024)    Received from [facility]    Social Connection and Isolation Panel [NHANES]     Frequency of Communication with Friends and Family: More than three times a week     Frequency of Social Gatherings with Friends and Family: More than three times a week     Attends Sikh Services: 1 to 4 times per year     Active Member of Clubs or Organizations: No     Attends Club or Organization Meetings: Never     Marital Status: Never    Intimate Partner Violence: Not on file   Housing Stability: Low Risk  (6/21/2024)    Received from [facility]    Housing Stability Vital Sign      Unable to Pay for Housing in the Last Year: No     Number of Places Lived in the Last Year: 1     Unstable Housing in the Last Year: No       No past surgical history on file.       Review of Systems:  30 point ROS reviewed and negative other than as listed in the HPI     Physical Exam:  Gen: The pt is A&Ox3, NAD, and appear state age and weight  Psychiatric: mood and affect are appropriate   Eyes: sclera are white, EOM grossly intact  ENT: MMM  Neck: supple, thyroid is midline  Respiratory: respirations are nonlabored, chest rise symmetric  CV: rate is regular by palpation of distal pulses  Abdomen: nondistended   Integument: no obvious cutaneous lesions noted. No signs of lymphangitis. No signs of systemic edema.   MSK:  L elbow  surgical incision well healing without edema, erythema, drainage, or other s/sx of infection. Appropriately tender to palpation around the incision. SILT intact throughout axillary, radial, median, and ulnar nerve distributions. Able to extend thumb and wrist. Hand warm and well perfused.     Imaging:  No imaging obtained at today's visit     Assessment   33 y.o. male post-op from ORIF L ulna with radial head arthroplasty on 6/4/24.     Plan:  Continue NWB on  LUE; can start uncoupled ROM of the elbow. With the hand pronated he can extend to 30 degrees and flex as tolerated; with the elbow at 90 degrees he can supinate/pronate as tolerated.   Incisions well healing; staples removed in the office today with wound care instructed    Continue DVT ppx and vit d/calcium for bone health    Follow-up in 3 weeks with 2 views left elbow    All of the patient's questions/concerns address and they are in agreement with the plan.

## 2024-07-15 ENCOUNTER — APPOINTMENT (OUTPATIENT)
Dept: OCCUPATIONAL THERAPY | Facility: HOSPITAL | Age: 34
End: 2024-07-15

## 2024-07-17 ENCOUNTER — APPOINTMENT (OUTPATIENT)
Dept: RADIOLOGY | Facility: CLINIC | Age: 34
End: 2024-07-17

## 2024-07-17 ENCOUNTER — OFFICE VISIT (OUTPATIENT)
Dept: ORTHOPEDIC SURGERY | Facility: CLINIC | Age: 34
End: 2024-07-17

## 2024-07-17 VITALS — WEIGHT: 300 LBS | HEIGHT: 71 IN | BODY MASS INDEX: 42 KG/M2

## 2024-07-17 DIAGNOSIS — S52.272D CLOSED MONTEGGIA'S FRACTURE OF LEFT ULNA WITH ROUTINE HEALING, SUBSEQUENT ENCOUNTER: Primary | ICD-10-CM

## 2024-07-17 PROCEDURE — 1036F TOBACCO NON-USER: CPT | Performed by: PHYSICIAN ASSISTANT

## 2024-07-17 PROCEDURE — 99211 OFF/OP EST MAY X REQ PHY/QHP: CPT | Performed by: PHYSICIAN ASSISTANT

## 2024-07-17 ASSESSMENT — PAIN - FUNCTIONAL ASSESSMENT: PAIN_FUNCTIONAL_ASSESSMENT: NO/DENIES PAIN
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
## 2024-08-27 ENCOUNTER — EVALUATION (OUTPATIENT)
Dept: OCCUPATIONAL THERAPY | Facility: HOSPITAL | Age: 34
End: 2024-08-27

## 2024-08-27 ENCOUNTER — APPOINTMENT (OUTPATIENT)
Dept: OCCUPATIONAL THERAPY | Facility: HOSPITAL | Age: 34
End: 2024-08-27

## 2024-08-27 DIAGNOSIS — S42.402A CLOSED FRACTURE OF LEFT ELBOW, INITIAL ENCOUNTER: Primary | ICD-10-CM

## 2024-08-27 PROCEDURE — 97167 OT EVAL HIGH COMPLEX 60 MIN: CPT | Mod: GO

## 2024-08-27 PROCEDURE — 97140 MANUAL THERAPY 1/> REGIONS: CPT | Mod: GO

## 2024-08-27 ASSESSMENT — ENCOUNTER SYMPTOMS
DEPRESSION: 0
LOSS OF SENSATION IN FEET: 0
OCCASIONAL FEELINGS OF UNSTEADINESS: 0
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
## 2024-09-11 ENCOUNTER — TREATMENT (OUTPATIENT)
Dept: OCCUPATIONAL THERAPY | Facility: HOSPITAL | Age: 34
End: 2024-09-11

## 2024-09-11 DIAGNOSIS — S42.402A CLOSED FRACTURE OF LEFT ELBOW, INITIAL ENCOUNTER: ICD-10-CM

## 2024-09-11 PROCEDURE — 97140 MANUAL THERAPY 1/> REGIONS: CPT | Mod: GO

## 2024-09-11 PROCEDURE — 97110 THERAPEUTIC EXERCISES: CPT | Mod: GO

## 2024-09-11 PROCEDURE — 97760 ORTHOTIC MGMT&TRAING 1ST ENC: CPT | Mod: GO

## 2024-09-11 NOTE — PROGRESS NOTES
Occupational Therapy Treatment    Patient Name: Herberth Brandt  MRN: [id number]  Today's Date: 9/12/2024  Time Calculation  Start Time: 0230  Stop Time: 0315  Time Calculation (min): 45 min  Problem List Items Addressed This Visit             ICD-10-CM    Closed fracture of left elbow, initial encounter S42.402A     DOS 6/4/24     Insurance  Visit 2 of MN by 11/14/24  Authorization: not required  Insurance plan: Payor: / self-pay (applied for Medicaid reinstatement)    Assessment: Improvement in active L elbow flexion and extension, none in supination. I was able to adapt a donated [employer] dynamic supination/pronation device, which Herberth took home and will use twice a day for 30 minutes.     Plan: therapeutic exercise, therapeutic activity, self-care, home management, manual therapy, neuromuscular coordination, vasopneumatic, electric stimulation, fluidotherapy, ultrasound, kinesiotaping, orthosis fabrication, scar/edema care  Goals  In 8-10 weeks, Herberth will achieve the following goals  He will be able to perform self-care, household, work, and leisure tasks with lower pain (1-3/10), 75% of the time.  He will improve L elbow range of motion in order to fully perform self-care, household, work and leisure tasks:  Extension/Flexion -10/130 degrees, Supination 70 degrees.   He will improve left wrist range of motion in order to fully perform self-care, household, work and leisure tasks:  Wrist Extension/Flexion  50/30    He will decrease his QuickDASH score by 20-30 points (63.64 at eval)  Patient's goals for therapy: get LUE straight and back to normal    Plan of care was developed with input and agreement by the patient  Frequency: 1-2 x Week  Duration: 12 Weeks    Precautions/Fall Risk:  Post Op Movement/Restrictions: No:  Weight Bearing Status:  NWB as of  7/17/24 visit with Joelle Mcclain    Subjective   Pain in the L shoulder but not elbow or wrist/hand. Doing HEP regularly. Feels he made progress in elbow ext/flex  but not sup. Medicaid reinstatement is still in progress - was notified that they're backed up.    [Was doing PT in [place] 7 sessions that ended 1 week ago, worked on edema reduction, improve AROM and strength ( strength, 1 lb weight). Wears ice sleeves for arm and fingers 2x/day for 30 min, which has helped reduce the edema ]    Prior level of function   Full motion and functional use of LUE    Pain  1/10 on average     Objective   Outcome Measure: QuickDASH: 63.64    Scar: adherent    Edema: moderate dorsal L hand/wrist/forearm    Sensation  WFL  Neuro exam:  Radial nerve: 4/5 strength in thumb extension, sensation intact to dorsal surface of thumb/index web space  Median nerve: 4/5 strength in thumb abduction and 3/5 opposition, sensation intact to palmar surface of index finger  Ulnar nerve: 3/5 strength in thumb/little finger approximation, sensation intact to palmar surface of little finger     AROM  Shoulder: L  flexion 120  extension WFL  abduction 130  external rotation WFL  internal rotation buttock     Elbow:   flexion  82   extension  -31   supination  neutral  pronation:  75     Wrist:  flexion 17   extension 40   radial deviation 15   ulnar deviation 15       Left Hand AROM (degrees)  FULL EXTENSION, NO DIP FLEXION   MCP PIP DIP DPC   IF     4   MF    4   RF    4   SF    3   Thumb       Thumb RABD       Thumb PABD           Strength TBE  Shoulder:  abduction /5  adduction /5  external rotation with shoulder at side /5  internal rotation with shoulder at side /5  flexion /5  extension /5    Elbow:  flexion /5  extension /5  supination /5  pronation /5    Wrist:  flexion /5  extension /5  radial deviation /5  ulnar deviation /5  pronation /5  supination /5    Hand:   strength (pos 2):     L , R   pinch strength: 3 point: L , R                             reyna:      L , R      Posture: shoulders mildly rounded, head slightly forward    Treatment  Education: home exercise program, plan of care,  activity modification, pain management, and pertinent anatomy     Orthosis: I fitted him with and issued a donated [employer] dynamic supination/pronation device. I instructed him in donning/doffing it, adjusting the supination force, which should feel like a stretch, not pain, and wearing it twice a day for 30 min  Modalities: Moist heat to L shoulder and proximal forearm while stretching in donated Scott dynamic supination device to prepare for treatment  Manual: STM and trigger point releases about L shoulder for pain relief and to relax tissues to allow greater joint motion as he sat with a donated dynamic supination device as a trial.   Therapeutic Exercise: Assessed posture. Instructed Herberth in active scapular motion (elevation, depression, retraction, combined downward rotation and retraction), chin tuck, countertop shoulder flexion stretch, and thread the needle stretch, which he performed correctly (added to HEP)     HEP: scar massage, use of moist heat in elbow extension stretch followed by elbow flexion, extension, and supination strengthening with double green band, active scapular motion (elevation, depression, retraction, combined downward rotation and retraction), chin tuck, countertop shoulder flexion stretch, and thread the needle stretch, 2-3 sets of 10 x/day. Use of dynamic supination device x 30 min 2x/day       OT Therapeutic Procedures Time Entry  Manual Therapy Time Entry: 10  Orthotic Management Training Time Entry: 10  Therapeutic Exercise Time Entry: 15  OT Modalities Time Entry  Hot/Cold Pack Time Entry: 10

## 2024-09-17 ENCOUNTER — TREATMENT (OUTPATIENT)
Dept: OCCUPATIONAL THERAPY | Facility: HOSPITAL | Age: 34
End: 2024-09-17

## 2024-09-17 DIAGNOSIS — S42.402A CLOSED FRACTURE OF LEFT ELBOW, INITIAL ENCOUNTER: ICD-10-CM

## 2024-09-17 PROCEDURE — 97140 MANUAL THERAPY 1/> REGIONS: CPT | Mod: GO

## 2024-09-17 PROCEDURE — 97110 THERAPEUTIC EXERCISES: CPT | Mod: GO

## 2024-09-17 NOTE — PROGRESS NOTES
Occupational Therapy Treatment    Patient Name: Herberth Brandt  MRN: [id number]  Today's Date: 9/17/2024  Time Calculation  Start Time: 1130  Stop Time: 1215  Time Calculation (min): 45 min  Problem List Items Addressed This Visit             ICD-10-CM    Closed fracture of left elbow, initial encounter S42.402A       DOS 6/4/24     Insurance  Visit 3 of MN by 11/14/24  Authorization: not required  Insurance plan: Payor: / self-pay (applied for Medicaid reinstatement)    Assessment: Minor improvement in supination after using the donated Scott dynamic supination device twice a day for 30 minutes for the last 5 days     Plan: therapeutic exercise, therapeutic activity, self-care, home management, manual therapy, neuromuscular coordination, vasopneumatic, electric stimulation, fluidotherapy, ultrasound, kinesiotaping, orthosis fabrication, scar/edema care  Goals  In 8-10 weeks, Herberth will achieve the following goals  He will be able to perform self-care, household, work, and leisure tasks with lower pain (1-3/10), 75% of the time.  He will improve L elbow range of motion in order to fully perform self-care, household, work and leisure tasks:  Extension/Flexion -10/130 degrees, Supination 70 degrees.   He will improve left wrist range of motion in order to fully perform self-care, household, work and leisure tasks:  Wrist Extension/Flexion  50/30    He will decrease his QuickDASH score by 20-30 points (63.64 at eval)  Patient's goals for therapy: get LUE straight and back to normal    Plan of care was developed with input and agreement by the patient  Frequency: 1-2 x Week  Duration: 12 Weeks    Precautions/Fall Risk:  Post Op Movement/Restrictions: No:  Weight Bearing Status:  NWB as of  7/17/24 visit with Joelle Mcclain    Subjective  (15 weeks s/p)  No pain. Using the Scott device twice x 30 min daily. Knows regaining supination it will be a slow process. Thought he had an appointment with Joelle Mcclain, but there isn't  one in our system, so he will call to schedule. He got outside x-rays after his last visit with her (7/17), brought a CD to her office, but staff told him she wouldn't be able to view it and gave it back to him.    [Was doing PT in [place] 7 sessions that ended 1 week ago, worked on edema reduction, improve AROM and strength ( strength, 1 lb weight). Wears ice sleeves for arm and fingers 2x/day for 30 min, which has helped reduce the edema ]    Prior level of function   Full motion and functional use of LUE    Pain  0/10     Objective   Outcome Measure: QuickDASH: 63.64    Scar: adherent    Edema: moderate dorsal L hand/wrist/forearm    Sensation  WFL  Neuro exam:  Radial nerve: 4/5 strength in thumb extension, sensation intact to dorsal surface of thumb/index web space  Median nerve: 4/5 strength in thumb abduction and 3/5 opposition, sensation intact to palmar surface of index finger  Ulnar nerve: 3/5 strength in thumb/little finger approximation, sensation intact to palmar surface of little finger     Negative Tinel's at L cubital tunnel, Guyon's canal, and ulnar tunnel at wrist.  L hand mild tremoring when using it or moving the L arm (I witnessed it)    AROM  Shoulder: L  flexion 120  extension WFL  abduction 130  external rotation WFL  internal rotation buttock     Elbow:   flexion  88   extension  -31   supination  10  pronation:  75     Wrist:  flexion 17 (24 passively)  extension 44   radial deviation 15   ulnar deviation 15       Left Hand AROM (degrees)  FULL EXTENSION, NO DIP FLEXION   MCP PIP DIP DPC   IF     0   MF    0   RF    3.75   SF    2.75   Thumb 0/17  0/34    Thumb RABD 46      Thumb PABD 54          Strength TBE  Shoulder:  abduction /5  adduction /5  external rotation with shoulder at side /5  internal rotation with shoulder at side /5  flexion /5  extension /5    Elbow:  flexion /5  extension /5  supination /5  pronation /5    Wrist:  flexion /5  extension /5  radial deviation /5  ulnar  deviation /5  pronation /5  supination /5    Hand:   strength (pos 2):     L , R   pinch strength: 3 point: L , R                             reyna:      L , R      Posture: shoulders mildly rounded, head slightly forward    Treatment  Education: home exercise program, plan of care, activity modification, pain management, and pertinent anatomy     Manual: Applied 2 I-strips of Kinesiotape in an X pattern with 50% tension over the L cubital tunnel for space correction over ulnar nerve. lateral epicondyle for space correction and instructed him/her in its wear/care/removal. 5 long webbed I-strips overlapping over the entire L forearm and dorsum of hand for edema reduction. Instructed him in its wear and care.  Therapeutic Exercise: UBE for gripping and general UE strengthening, level 2 x 2 min forward, backward and in L shoulder abduction. Herberth threw overhead and chest passes into rebounder with 2 lb ball emphasizing elbow extension and flexion until fatigue in standing, then he threw a small 2 lb ball underhand into the rebounder with his L hand focusing on elbow extension until fatigue in standing. He carried a 7.5 lb kettle bell around the clinic x 3 laps for elbow extension stretch. Instructed him in brachial plexus gliding to address the tremoring, added to HEP       HEP: scar massage, use of moist heat in elbow extension stretch followed by elbow flexion, extension, and supination strengthening with double green band, active scapular motion (elevation, depression, retraction, combined downward rotation and retraction), chin tuck, countertop shoulder flexion stretch, and thread the needle stretch,brachial plexus gliding 2-3 sets of 10 x/day. Use of dynamic supination device x 30 min 2x/day       OT Therapeutic Procedures Time Entry  Manual Therapy Time Entry: 10  Therapeutic Exercise Time Entry: 35

## 2024-09-24 ENCOUNTER — TREATMENT (OUTPATIENT)
Dept: OCCUPATIONAL THERAPY | Facility: HOSPITAL | Age: 34
End: 2024-09-24

## 2024-09-24 ENCOUNTER — APPOINTMENT (OUTPATIENT)
Dept: PHYSICAL THERAPY | Facility: HOSPITAL | Age: 34
End: 2024-09-24

## 2024-09-24 DIAGNOSIS — S42.402A CLOSED FRACTURE OF LEFT ELBOW, INITIAL ENCOUNTER: ICD-10-CM

## 2024-09-24 PROCEDURE — 97110 THERAPEUTIC EXERCISES: CPT | Mod: GO

## 2024-09-24 NOTE — PROGRESS NOTES
Occupational Therapy Treatment    Patient Name: Herberth Brandt  MRN: [id number]  Today's Date: 9/24/2024  Time Calculation  Start Time: 1115  Stop Time: 1200  Time Calculation (min): 45 min  Problem List Items Addressed This Visit             ICD-10-CM    Closed fracture of left elbow, initial encounter S42.402A       DOS 6/4/24     Insurance  Visit 3 of [place] by 11/14/24  Authorization: not required  Insurance plan: Payor: / self-pay (applied for Medicaid reinstatement)    Assessment: Improvement in many motions, decreased edema     Plan: therapeutic exercise, therapeutic activity, self-care, home management, manual therapy, neuromuscular coordination, vasopneumatic, electric stimulation, fluidotherapy, ultrasound, kinesiotaping, orthosis fabrication, scar/edema care  Goals  In 8-10 weeks, Herberth will achieve the following goals  He will be able to perform self-care, household, work, and leisure tasks with lower pain (1-3/10), 75% of the time.  He will improve L elbow range of motion in order to fully perform self-care, household, work and leisure tasks:  Extension/Flexion -10/130 degrees, Supination 70 degrees.   He will improve left wrist range of motion in order to fully perform self-care, household, work and leisure tasks:  Wrist Extension/Flexion  50/30    He will decrease his QuickDASH score by 20-30 points (63.64 at eval)  Patient's goals for therapy: get LUE straight and back to normal    Plan of care was developed with input and agreement by the patient  Frequency: 1-2 x Week  Duration: 12 Weeks    Precautions/Fall Risk:  Post Op Movement/Restrictions: No:  Weight Bearing Status:  NWB as of  7/17/24 visit with Joelle Mcclain    Subjective  (16 weeks s/p)  No pain. Using the Scott device twice x 30 min daily. Knows regaining supination it will be a slow process.   Kinesiotape lasted 2 days, feels it helped with swelling. Felt the ball throwing/catching helped a lot. Decreased edema and hand tremors.    [Was doing PT  in [place] 7 sessions that ended 1 week before starting here, worked on edema reduction, improve AROM and strength ( strength, 1 lb weight). Wears ice sleeves for arm and fingers 2x/day for 30 min, which has helped reduce the edema ]    Prior level of function   Full motion and functional use of LUE    Pain  0/10     Objective   Outcome Measure: QuickDASH: 63.64    Scar: adherent    Edema: moderate dorsal L hand/wrist/forearm    Sensation  WFL  Neuro exam:  Radial nerve: 4/5 strength in thumb extension, sensation intact to dorsal surface of thumb/index web space  Median nerve: 4/5 strength in thumb abduction and 3/5 opposition, sensation intact to palmar surface of index finger  Ulnar nerve: 3/5 strength in thumb/little finger approximation, sensation intact to palmar surface of little finger     Negative Tinel's at L cubital tunnel, Guyon's canal, and ulnar tunnel at wrist.  L hand mild tremoring when using it or moving the L arm (I witnessed it)    AROM LUE  Shoulder:   flexion 120  extension WFL  abduction 130  external rotation WFL  internal rotation L2     Elbow:   flexion  88   extension  -28   supination  23  pronation:  75     Wrist:  flexion 20 (24 passively)  extension 48   radial deviation 20   ulnar deviation 20       Left Hand AROM (degrees)  FULL EXTENSION, NO DIP FLEXION   MCP PIP DIP DPC   IF     0   MF    0   RF    3   SF    2   Thumb 0/17  0/34    Thumb RABD 46      Thumb PABD 54          Strength RUE   Shoulder:  abduction /5  adduction /5  external rotation with shoulder at side /5  internal rotation with shoulder at side /5  flexion /5  extension /5    Elbow:  flexion 4/5  extension 3/5  supination /5  pronation /5    Wrist:  flexion /5  extension /5  radial deviation /5  ulnar deviation /5  pronation /5  supination /5    Hand:   strength (pos 2):     L 7, R 48       Posture: shoulders mildly rounded, head slightly forward    Treatment  Education: home exercise program, plan of care,  activity modification, pain management, and pertinent anatomy     Manual: Scar massage with dycem (issued).   Therapeutic Exercise: Assessed  strength, reassessed AROM, scar, symptoms, functional status, HEP and compliance. In- structed him in hand strengthening with hand helper (issued), added to HEP. UBE for gripping and general UE strengthening, level 4 x 2 min forward, backward. Herberth pugh passed a medium therapy ball back and forth with me using large powerful shoulder and elbow movements x 15. He then did wall pushups into the therapy ball x20. Wall pushups added to HEP. Triceps pull downs and elbow flexion in 3 forearm positions with double green band x 20 each.       HEP: scar massage with dycem, use of moist heat in elbow extension stretch followed by elbow flexion (in 3 forearm positions), extension, and supination strengthening with double green band, active scapular motion (elevation, depression, retraction, combined downward rotation and retraction), wall pushups,  strengthening with hand helper, chin tuck, countertop shoulder flexion stretch, and thread the needle stretch, brachial plexus gliding 2-3 sets of 10 x/day. Use of dynamic supination device x 30 min 2x/day       OT Therapeutic Procedures Time Entry  Therapeutic Exercise Time Entry: 45

## 2024-10-01 ENCOUNTER — TREATMENT (OUTPATIENT)
Dept: OCCUPATIONAL THERAPY | Facility: HOSPITAL | Age: 34
End: 2024-10-01

## 2024-10-01 DIAGNOSIS — S42.402A CLOSED FRACTURE OF LEFT ELBOW, INITIAL ENCOUNTER: ICD-10-CM

## 2024-10-01 PROCEDURE — 97110 THERAPEUTIC EXERCISES: CPT | Mod: GO

## 2024-10-01 NOTE — PROGRESS NOTES
Occupational Therapy Treatment    Patient Name: Herberth Brandt  MRN: [id number]  Today's Date: 10/1/2024  Time Calculation  Start Time: 0330  Stop Time: 0415  Time Calculation (min): 45 min  Problem List Items Addressed This Visit             ICD-10-CM    Closed fracture of left elbow, initial encounter S42.402A       DOS 6/4/24     Insurance  Visit 5 of [place] by 11/14/24  Authorization: not required  Insurance plan: Payor: / self-pay (applied for Medicaid reinstatement)    Assessment: Improvement in many LUE motions, but not supination; increased L  strength.     Plan: therapeutic exercise, therapeutic activity, self-care, home management, manual therapy, neuromuscular coordination, vasopneumatic, electric stimulation, fluidotherapy, ultrasound, kinesiotaping, orthosis fabrication, scar/edema care  Goals  In 8-10 weeks, Herberth will achieve the following goals  He will be able to perform self-care, household, work, and leisure tasks with lower pain (1-3/10), 75% of the time.  He will improve L elbow range of motion in order to fully perform self-care, household, work and leisure tasks:  Extension/Flexion -10/130 degrees, Supination 70 degrees.   He will improve left wrist range of motion in order to fully perform self-care, household, work and leisure tasks:  Wrist Extension/Flexion  50/30    He will decrease his QuickDASH score by 20-30 points (63.64 at eval)  Patient's goals for therapy: get LUE straight and back to normal    Plan of care was developed with input and agreement by the patient  Frequency: 1-2 x Week  Duration: 12 Weeks    Precautions/Fall Risk:  Post Op Movement/Restrictions: No:  Weight Bearing Status:  NWB as of  7/17/24 visit with Joelle Mcclain    Subjective  (17 weeks s/p)  No pain now, but if he stands too long his L shoulder starts hurting. He participated in a [place] event, was standing a long time and his shoulder hurt. He did no lifting. Carrying 5 lb weight around the gym to help  straighten elbow.    Using the [employer]here device twice x 30 min daily. Knows regaining supination it will be a slow process.     [Was doing PT in [place] 7 sessions that ended 1 week before starting here, worked on edema reduction, improve AROM and strength ( strength, 1 lb weight). Wears ice sleeves for arm and fingers 2x/day for 30 min, which has helped reduce the edema ]    Prior level of function   Full motion and functional use of LUE    Pain  0/10     Objective   Outcome Measure: QuickDASH: 63.64    Scar: adherent    Edema: moderate dorsal L hand/wrist/forearm    Sensation  WFL  Neuro exam:  Radial nerve: 4/5 strength in thumb extension, sensation intact to dorsal surface of thumb/index web space  Median nerve: 4/5 strength in thumb abduction and 3/5 opposition, sensation intact to palmar surface of index finger  Ulnar nerve: 3/5 strength in thumb/little finger approximation, sensation intact to palmar surface of little finger     Negative Tinel's at L cubital tunnel, Guyon's canal, and ulnar tunnel at wrist.  L hand mild tremoring when using it or moving the L arm (I witnessed it)    AROM LUE  Shoulder:   flexion 136  extension WFL  abduction 148  external rotation WFL  internal rotation L2 (R T11)    Elbow:   flexion  92   extension  -18   supination  23  pronation:  75     Wrist:  flexion 25  extension 58   radial deviation 20   ulnar deviation 20       Left Hand AROM (degrees)  FULL EXTENSION, NO DIP FLEXION   MCP PIP DIP DPC   IF     0   MF    0   RF    0   SF    2.5   Thumb 0/24  0/44    Thumb RABD 46      Thumb PABD 54          Strength RUE   Shoulder:  abduction 4/5  adduction 4/5  external rotation with shoulder at side 4-/5  internal rotation with shoulder at side 4-/5  flexion 4/5  extension 4/5    Elbow:  flexion 4/5  extension 3/5  supination 3+/5  pronation 4-/5    Wrist:  flexion 3+/5  extension 3+/5  pronation 3+/5  supination 4-/5    Hand:   strength (pos 2):     L 11, R 48 lbs    Posture:  shoulders mildly rounded, head slightly forward    Treatment  Education: home exercise program, plan of care, activity modification, pain management, and pertinent anatomy     Therapeutic Exercise: Assessed LUE strength, reassessed  strength, AROM, scar, symptoms, functional status, HEP and compliance. UBE for gripping and general UE strengthening, level 4 x 3 min forward, backward. Pulley for shoulder stretching in internal rotation x 10 with 10 second holds. Instructed Herberth in shoulder strengthening (flexion, abduction, extension, internal rotation, and external rotation) with green theraband loop, which he performed correctly; added to HEP. Instructed him to wear dynamic supination device 3x/day for 30 min.     HEP: scar massage with dycem, use of moist heat in elbow extension stretch followed by elbow flexion (in 3 forearm positions), extension, and supination strengthening with double green band, active scapular motion (elevation, depression, retraction, combined downward rotation and retraction), shoulder strengthening (flexion, abduction, extension, internal rotation, and external rotation) with green theraband loop, wall pushups,  strengthening with hand helper, chin tuck, countertop shoulder flexion stretch with thread the needle stretch, brachial plexus gliding 2-3 sets of 10 x/day. Use of dynamic supination device x 30 min 3x/day       OT Therapeutic Procedures Time Entry  Therapeutic Exercise Time Entry: 45

## 2024-10-02 ENCOUNTER — APPOINTMENT (OUTPATIENT)
Dept: ORTHOPEDIC SURGERY | Facility: CLINIC | Age: 34
End: 2024-10-02

## 2024-10-08 ENCOUNTER — TREATMENT (OUTPATIENT)
Dept: OCCUPATIONAL THERAPY | Facility: HOSPITAL | Age: 34
End: 2024-10-08

## 2024-10-08 DIAGNOSIS — S42.402A CLOSED FRACTURE OF LEFT ELBOW, INITIAL ENCOUNTER: ICD-10-CM

## 2024-10-08 PROCEDURE — 97110 THERAPEUTIC EXERCISES: CPT | Mod: GO

## 2024-10-08 NOTE — PROGRESS NOTES
Occupational Therapy Treatment    Patient Name: Herberth Brandt  MRN: [id number]  Today's Date: 10/8/2024  Time Calculation  Start Time: 0245  Stop Time: 0330  Time Calculation (min): 45 min  Problem List Items Addressed This Visit             ICD-10-CM    Closed fracture of left elbow, initial encounter S42.402A       DOS 6/4/24     Insurance  Visit 6 of [place] by 11/14/24  Authorization: not required  Insurance plan: Payor: / self-pay (applied for Medicaid reinstatement, in process)    Assessment: Improvement in many LUE motions, and a few degrees more supination.     Plan: therapeutic exercise, therapeutic activity, self-care, home management, manual therapy, neuromuscular coordination, vasopneumatic, electric stimulation, fluidotherapy, ultrasound, kinesiotaping, orthosis fabrication, scar/edema care  Goals  In 8-10 weeks, Herberth will achieve the following goals  He will be able to perform self-care, household, work, and leisure tasks with lower pain (1-3/10), 75% of the time.  He will improve L elbow range of motion in order to fully perform self-care, household, work and leisure tasks:  Extension/Flexion -10/130 degrees, Supination 70 degrees.   He will improve left wrist range of motion in order to fully perform self-care, household, work and leisure tasks:  Wrist Extension/Flexion  50/30    He will decrease his QuickDASH score by 20-30 points (63.64 at eval)  Patient's goals for therapy: get LUE straight and back to normal    Plan of care was developed with input and agreement by the patient  Frequency: 1-2 x Week  Duration: 12 Weeks    Precautions/Fall Risk:  Post Op Movement/Restrictions: No:  Weight Bearing Status:  NWB as of  7/17/24 visit with Joelle Mcclain    Subjective  (4 months s/p)  Was turned away in orthopedics due to lack of insurance, but gave his x-ray cd to Joelle Mcclain.  Notices more strength. Shoulder has not been bothering him even when standing too long. Will try carrying 10 lbs around the gym to  help straighten elbow. Still can't bring anything to his mouth with his L hand. Flexion is more functional/important to him    Using the Scott device twice x 30 min daily. Knows regaining supination it will be a slow process.     Prior level of function   Full motion and functional use of LUE    Pain  0/10     Objective   Outcome Measure: QuickDASH: 63.64    Scar: adherent    Edema: moderate dorsal L hand/wrist/forearm    Sensation  WFL  Neuro exam:  Radial nerve: 4/5 strength in thumb extension, sensation intact to dorsal surface of thumb/index web space  Median nerve: 4/5 strength in thumb abduction and 3/5 opposition, sensation intact to palmar surface of index finger  Ulnar nerve: 3/5 strength in thumb/little finger approximation, sensation intact to palmar surface of little finger     Negative Tinel's at L cubital tunnel, Guyon's canal, and ulnar tunnel at wrist.  L hand mild tremoring when using it or moving the L arm (I witnessed it)    AROM LUE  Shoulder:   flexion 136  extension WFL  abduction 148  external rotation WFL  internal rotation L1 (R T11)    Elbow:   flexion  96   extension  -26   supination  26  pronation:  75     Wrist:  flexion 25  extension 50   radial deviation 20   ulnar deviation 20       Left Hand AROM (degrees)  FULL EXTENSION, NO DIP FLEXION with composite finger flexion   MCP PIP DIP DPC   IF     0   MF    0   RF    0   SF    2.5   Thumb 0/25  0/50    Thumb RABD 46      Thumb PABD 54          Strength RUE   Shoulder:  abduction 4/5  adduction 4/5  external rotation with shoulder at side 4-/5  internal rotation with shoulder at side 4-/5  flexion 4/5  extension 4/5    Elbow:  flexion 4/5  extension 3/5  supination 3+/5  pronation 4-/5    Wrist:  flexion 3+/5  extension 3+/5  pronation 3+/5  supination 4-/5    Hand:   strength (pos 2):     L 11, R 48 lbs    Posture: shoulders mildly rounded, head slightly forward    Treatment  Education: home exercise program, plan of care, activity  modification, pain management, and pertinent anatomy     Modalities: Supine heated elbow flexion stretch with 1 lb (shoulder supported in 60 deg of flexion, elbow maximally flexed, one end of the 1 lb weight in his hand) to prepare for treatment and increase elbow flexion.  Therapeutic Exercise: Reassessed LUE AROM, scar, symptoms, functional status, HEP and compliance. In supine Herberth strengthened elbow flexion with 2 lbs x 2 sets of 10. Then he rotated his forearm toward supination and pronation x 2 sets of 10. He tossed and caught a 2 lb ball using elbow extension and flexion x 2 sets of 10. He then held the ball with both hands and flexed/extended his shoulders, then did diagonal motions, each x 15 reps.     HEP: scar massage with dycem, use of moist heat in elbow extension stretch followed by elbow flexion (in 3 forearm positions), extension, and supination strengthening with double green band, active scapular motion (elevation, depression, retraction, combined downward rotation and retraction), shoulder strengthening (flexion, abduction, extension, internal rotation, and external rotation) with green theraband loop, wall pushups,  strengthening with hand helper, chin tuck, countertop shoulder flexion stretch with thread the needle stretch, brachial plexus gliding 2-3 sets of 10 x/day. Use of dynamic supination device x 30 min 3x/day       OT Therapeutic Procedures Time Entry  Therapeutic Exercise Time Entry: 35  OT Modalities Time Entry  Hot/Cold Pack Time Entry: 10 (not charged)

## 2024-10-15 ENCOUNTER — TREATMENT (OUTPATIENT)
Dept: OCCUPATIONAL THERAPY | Facility: HOSPITAL | Age: 34
End: 2024-10-15

## 2024-10-15 DIAGNOSIS — S42.402A CLOSED FRACTURE OF LEFT ELBOW, INITIAL ENCOUNTER: ICD-10-CM

## 2024-10-15 PROCEDURE — 97110 THERAPEUTIC EXERCISES: CPT | Mod: GO

## 2024-10-15 PROCEDURE — 97140 MANUAL THERAPY 1/> REGIONS: CPT | Mod: GO

## 2024-10-15 NOTE — PROGRESS NOTES
Occupational Therapy Treatment    Patient Name: Herberth Brandt  MRN: [id number]  Today's Date: 10/15/2024  Time Calculation  Start Time: 0945  Stop Time: 1045  Time Calculation (min): 60 min  Problem List Items Addressed This Visit             ICD-10-CM    Closed fracture of left elbow, initial encounter S42.402A       DOS 6/4/24     Insurance  Visit 7 of [place] by 11/14/24  Authorization: not required  Insurance plan: Payor: / self-pay (applied for Medicaid reinstatement, in process)    Assessment: No real improvement in LUE motions despite doing home program 2x/day. About to be approved for Medicaid.     Plan: therapeutic exercise, therapeutic activity, self-care, home management, manual therapy, neuromuscular coordination, vasopneumatic, electric stimulation, fluidotherapy, ultrasound, kinesiotaping, orthosis fabrication, scar/edema care  Goals  In 8-10 weeks, Herberth will achieve the following goals  He will be able to perform self-care, household, work, and leisure tasks with lower pain (1-3/10), 75% of the time.  He will improve L elbow range of motion in order to fully perform self-care, household, work and leisure tasks:  Extension/Flexion -10/130 degrees, Supination 70 degrees.   He will improve left wrist range of motion in order to fully perform self-care, household, work and leisure tasks:  Wrist Extension/Flexion  50/30    He will decrease his QuickDASH score by 20-30 points (63.64 at eval)  Patient's goals for therapy: get LUE straight and back to normal    Plan of care was developed with input and agreement by the patient  Frequency: 1-2 x Week  Duration: 12 Weeks    Precautions/Fall Risk:  Post Op Movement/Restrictions: No:  Weight Bearing Status:  NWB as of  7/17/24 visit with Joelle Mcclain    Subjective  (4 months s/p)  Notices more strength. Everything is getting easier to do.  Doing HEP and using the Scott device twice x 30 min daily. Knows regaining supination it will be a slow process.     Prior level of  function   Full motion and functional use of LUE    Pain  0/10     Objective   Outcome Measure: QuickDASH: 63.64    Scar: adherent    Edema: persistent moderate dorsal L hand/wrist/forearm    Sensation  WFL  Neuro exam:  Radial nerve: 4/5 strength in thumb extension, sensation intact to dorsal surface of thumb/index web space  Median nerve: 4/5 strength in thumb abduction and 3/5 opposition, sensation intact to palmar surface of index finger  Ulnar nerve: 3/5 strength in thumb/little finger approximation, sensation intact to palmar surface of little finger     Negative Tinel's at L cubital tunnel, Guyon's canal, and ulnar tunnel at wrist.  L hand mild tremoring when using it or moving the L arm (I witnessed it)    AROM LUE  Shoulder:   flexion 136  extension WFL  abduction 148  external rotation WFL  internal rotation L1 (R T11)    Elbow:   flexion  94   extension  -24   supination  20  pronation:  75     Wrist:  flexion 25  extension 20   radial deviation 20   ulnar deviation 20       Left Hand AROM (degrees)  FULL EXTENSION, NO DIP FLEXION with composite finger flexion   MCP PIP DIP DPC   IF     0   MF    0   RF    0   SF    2   Thumb 0/25  0/50    Thumb RABD 46      Thumb PABD 54          Strength RUE   Shoulder:  abduction 4/5  adduction 4/5  external rotation with shoulder at side 4-/5  internal rotation with shoulder at side 4-/5  flexion 4/5  extension 4/5    Elbow:  flexion 4/5  extension 3/5  supination 3+/5  pronation 4-/5    Wrist:  flexion 3+/5  extension 3+/5  pronation 3+/5  supination 4-/5    Hand:   strength (pos 2):     L 11, R 48 lbs    Posture: shoulders mildly rounded, head slightly forward    Treatment  Education: home exercise program, plan of care, activity modification, pain management, and pertinent anatomy     Modalities: Supine heated elbow flexion stretch with 2 lbs (shoulder supported in 60 deg of flexion, elbow maximally flexed, one end of the 2 lb weight in his hand) to prepare for  treatment and increase elbow flexion. Also had heat under L shoulder due to tightness in IR.  Manual: STM and trigger point releases about L shoulder and elbow to relax tissues to allow greater joint motion. Radial head glides while Herberth actively pronated/supinated his L forearm (did not make the movements easier).  Therapeutic Exercise: Reassessed LUE AROM, scar, symptoms, functional status, HEP and compliance. In supine Herberth strengthened elbow and shoulder with blue theraband x 10 reps of each movement. Advanced to blue theraband for HEP.     HEP: scar massage with dycem, use of moist heat in elbow extension stretch followed by elbow flexion (in 3 forearm positions), extension, and supination strengthening with double blue band, active scapular motion (elevation, depression, retraction, combined downward rotation and retraction), shoulder strengthening (flexion, abduction, extension, internal rotation, and external rotation) with green theraband loop, wall pushups,  strengthening with hand helper, chin tuck, countertop shoulder flexion stretch with thread the needle stretch, brachial plexus gliding 2-3 sets of 10 x/day. Use of dynamic supination device x 30 min 3x/day       OT Therapeutic Procedures Time Entry  Manual Therapy Time Entry: 25  Therapeutic Exercise Time Entry: 23  OT Modalities Time Entry  Hot/Cold Pack Time Entry: 12 (not charged)

## 2024-10-22 ENCOUNTER — TREATMENT (OUTPATIENT)
Dept: OCCUPATIONAL THERAPY | Facility: HOSPITAL | Age: 34
End: 2024-10-22

## 2024-10-22 DIAGNOSIS — S42.402A CLOSED FRACTURE OF LEFT ELBOW, INITIAL ENCOUNTER: ICD-10-CM

## 2024-10-22 PROCEDURE — 97140 MANUAL THERAPY 1/> REGIONS: CPT | Mod: GO

## 2024-10-22 PROCEDURE — 97110 THERAPEUTIC EXERCISES: CPT | Mod: GO

## 2024-10-22 NOTE — PROGRESS NOTES
Occupational Therapy Treatment    Patient Name: Herberth Brandt  MRN: [id number]  Today's Date: 10/23/2024  Time Calculation  Start Time: 1000  Stop Time: 1100  Time Calculation (min): 60 min  Problem List Items Addressed This Visit             ICD-10-CM    Closed fracture of left elbow, initial encounter S42.402A       DOS 6/4/24     Insurance  Visit 8 of [place] by 11/14/24  Authorization: not required  Insurance plan: Payor: / self-pay (applied for Medicaid reinstatement, in process)    Assessment: 10 degree elbow flexion increase after heated, weighted flexion stretch. Increased IR     Plan: strap-assisted wrist flex/ext mobilization, forearm mobilization, therapeutic exercise, therapeutic activity, self-care, home management, manual therapy, neuromuscular coordination, vasopneumatic, electric stimulation, fluidotherapy, ultrasound, kinesiotaping, orthosis fabrication, scar/edema care  Goals  In 8-10 weeks, Herberth will achieve the following goals  He will be able to perform self-care, household, work, and leisure tasks with lower pain (1-3/10), 75% of the time.  He will improve L elbow range of motion in order to fully perform self-care, household, work and leisure tasks:  Extension/Flexion -10/130 degrees, Supination 70 degrees.   He will improve left wrist range of motion in order to fully perform self-care, household, work and leisure tasks:  Wrist Extension/Flexion  50/30    He will decrease his QuickDASH score by 20-30 points (63.64 at eval)  Patient's goals for therapy: get LUE straight and back to normal    Plan of care was developed with input and agreement by the patient  Frequency: 1-2 x Week  Duration: 12 Weeks    Precautions/Fall Risk:  Post Op Movement/Restrictions: No:  Weight Bearing Status:  NWB as of  7/17/24 visit with Joelle Mcclain    Subjective  (4 months s/p)  Notices more strength. Everything is getting easier to do.  Doing HEP and using the Scott device twice x 30 min daily. Knows regaining  supination it will be a slow process.     Prior level of function   Full motion and functional use of LUE    Pain  0/10     Objective   Outcome Measure: QuickDASH: 63.64    Scar: adherent    Edema: persistent moderate dorsal L hand/wrist/forearm    Sensation  WFL  Neuro exam:  Radial nerve: 4/5 strength in thumb extension, sensation intact to dorsal surface of thumb/index web space  Median nerve: 4/5 strength in thumb abduction and 3/5 opposition, sensation intact to palmar surface of index finger  Ulnar nerve: 3/5 strength in thumb/little finger approximation, sensation intact to palmar surface of little finger     Negative Tinel's at L cubital tunnel, Guyon's canal, and ulnar tunnel at wrist.  L hand mild tremoring when using it or moving the L arm (I witnessed it)    AROM LUE  Shoulder:   flexion 136  extension WFL  abduction 148  external rotation WFL  internal rotation T12 (R T11)    Elbow:   flexion  94 (104 in supine after weighted, heated stretch)  extension  -27   supination  20  pronation:  75     Wrist:  flexion 25  extension 20   radial deviation 20   ulnar deviation 20       Left Hand AROM (degrees)  FULL EXTENSION, mild DIP FLEXION with composite finger flexion   MCP PIP DIP DPC   IF     0   MF    0   RF    0   SF    2   Thumb 0/25  0/50    Thumb RABD 46      Thumb PABD 54          Strength RUE   Shoulder:  abduction 4/5  adduction 4/5  external rotation with shoulder at side 4-/5  internal rotation with shoulder at side 4-/5  flexion 4/5  extension 4/5    Elbow:  flexion 4/5  extension 3/5  supination 3+/5  pronation 4-/5    Wrist:  flexion 3+/5  extension 3+/5  pronation 3+/5  supination 4-/5    Hand:   strength (pos 2):     L 11, R 48 lbs    Posture: shoulders mildly rounded, head slightly forward    Treatment  Education: home exercise program, plan of care, activity modification, pain management, and pertinent anatomy     Modalities: Supine heated elbow flexion stretch with 3 lbs (shoulder  supported in 60 deg of flexion, elbow maximally flexed, one end of the 3 lb weight in his hand) to prepare for treatment and increase elbow flexion. Also had heat under L shoulder due to tightness in IR.  Manual: Passive L shoulder flexion with scapular mobilization followed by him actively doing the same motion with 3 lbs x 2 sets of 20; passive scapular clock followed by him doing it actively x 2 sets of 10.   Therapeutic Exercise: Reassessed LUE AROM, scar, symptoms, functional status, HEP and compliance. In supine Herberth strengthened elbow flexion with green theraband loop x 2 sets of 10. Sidelying L shoulder flexion/extension, ab/duction, er/ir with elbow flexed x 2 sets of 10 each.     HEP: scar massage with dycem, use of moist heat in elbow extension or flexion stretch followed by elbow flexion (in 3 forearm positions), extension, and supination strengthening with double blue band, active scapular motion (elevation, depression, retraction, combined downward rotation and retraction), shoulder strengthening (flexion, abduction, extension, internal rotation, and external rotation) with blue theraband loop, wall pushups,  strengthening with hand helper, chin tuck, countertop shoulder flexion stretch with thread the needle stretch, brachial plexus gliding 2-3 sets of 10 x/day. Use of dynamic supination device x 30 min 3x/day       OT Therapeutic Procedures Time Entry  Manual Therapy Time Entry: 20  Therapeutic Exercise Time Entry: 28  OT Modalities Time Entry  Hot/Cold Pack Time Entry: 12 (not charged)

## 2024-10-29 ENCOUNTER — APPOINTMENT (OUTPATIENT)
Dept: OCCUPATIONAL THERAPY | Facility: HOSPITAL | Age: 34
End: 2024-10-29

## 2024-11-21 ENCOUNTER — APPOINTMENT (OUTPATIENT)
Dept: OCCUPATIONAL THERAPY | Facility: HOSPITAL | Age: 34
End: 2024-11-21

## 2024-11-26 ENCOUNTER — APPOINTMENT (OUTPATIENT)
Dept: OCCUPATIONAL THERAPY | Facility: HOSPITAL | Age: 34
End: 2024-11-26

## 2024-12-17 ENCOUNTER — DOCUMENTATION (OUTPATIENT)
Dept: OCCUPATIONAL THERAPY | Facility: HOSPITAL | Age: 34
End: 2024-12-17
Payer: MEDICAID

## 2024-12-17 NOTE — PROGRESS NOTES
Discharge Summary    Name: Herberth Brandt  MRN: [id number]  : 1990  Date: 24    Discharge Summary: OT    Discharge Information: Date of last visit 982172, Date of evaluation 817, Number of attended visits 8, Referred by Sisi, and Referred for monteggia fx    Therapy Summary: wound/scar/edema management, pain, weakness, paresthesia, splinting, HEP, functional hand use      Discharge Status: unknown     Rehab Discharge Reason: Other He has a financial assistance plan  that allowed him to be seen in OT 24 - 24. If he wants more OT he needs to go through financial services.

## 2025-08-28 ENCOUNTER — DOCUMENTATION (OUTPATIENT)
Dept: ORTHOPEDIC SURGERY | Facility: HOSPITAL | Age: 35
End: 2025-08-28
Payer: MEDICAID

## (undated) DEVICE — Device

## (undated) DEVICE — DRAPE, SHEET, U, W/ADHESIVE STRIP, IMPERVIOUS, 60 X 70 IN, DISPOSABLE, LF, STERILE

## (undated) DEVICE — DRAPE, INCISE, ANTIMICROBIAL, IOBAN 2, LARGE, 17 X 23 IN, DISPOSABLE, STERILE

## (undated) DEVICE — SUTURE, VICRYL, 0, 18 IN, CT-1, VIOLET

## (undated) DEVICE — APPLICATOR, CHLORAPREP, W/ORANGE TINT, 26ML

## (undated) DEVICE — BANDAGE, ELASTIC, MATRIX, SELF-CLOSURE, 3 IN X 5 YD, LF

## (undated) DEVICE — BANDAGE, ELASTIC, MATRIX, SELF-CLOSURE, 4 IN X 5 YD, LF

## (undated) DEVICE — IRRIGATION SET, Y, LARGE BORE

## (undated) DEVICE — SUTURE, ETHILON, 2-0, FSLX 30, BLACK

## (undated) DEVICE — MANIFOLD, 4 PORT NEPTUNE STANDARD

## (undated) DEVICE — BANDAGE, GAUZE, CONFORMING, KERLIX, 6 PLY, 4.5 IN X 4.1 YD

## (undated) DEVICE — DRAPE, SHEET, THREE QUARTER, FAN FOLD, 57 X 77 IN

## (undated) DEVICE — COVER, C-ARM W/CLIPS, OEC GE

## (undated) DEVICE — BANDAGE, ESMARK, 3 IN X 9 FT, LF

## (undated) DEVICE — STAPLER, SKIN PROXIMATE, 35 WIDE

## (undated) DEVICE — PADDING, WEBRIL, UNDERCAST, STERILE, 4 IN

## (undated) DEVICE — APPLICATOR, PREP, CHLORAPREP, W/ORANGE TINT, 10.5ML

## (undated) DEVICE — COVER, CART, 45 X 27 X 48 IN, CLEAR

## (undated) DEVICE — SUTURE, VICRYL, 2-0, 27 IN, FSL, UNDYED